# Patient Record
Sex: FEMALE | Race: BLACK OR AFRICAN AMERICAN | NOT HISPANIC OR LATINO | Employment: UNEMPLOYED | URBAN - METROPOLITAN AREA
[De-identification: names, ages, dates, MRNs, and addresses within clinical notes are randomized per-mention and may not be internally consistent; named-entity substitution may affect disease eponyms.]

---

## 2017-01-12 ENCOUNTER — CLINICAL SUPPORT (OUTPATIENT)
Dept: OBSTETRICS AND GYNECOLOGY | Facility: CLINIC | Age: 22
End: 2017-01-12
Payer: COMMERCIAL

## 2017-01-12 DIAGNOSIS — Z30.42 ENCOUNTER FOR DEPO-PROVERA CONTRACEPTION: Primary | ICD-10-CM

## 2017-01-12 PROCEDURE — 96372 THER/PROPH/DIAG INJ SC/IM: CPT | Mod: S$GLB,,, | Performed by: NURSE PRACTITIONER

## 2017-01-12 RX ADMIN — MEDROXYPROGESTERONE ACETATE 150 MG: 150 INJECTION, SUSPENSION INTRAMUSCULAR at 11:01

## 2017-01-12 NOTE — PROGRESS NOTES
ID pt with name and , verified allergies. Per MAR gave depo provera IM to left ventrogluteal.  Pt tolerated well.  Advised to remain in clinic 15 minutes.  Next dose due in 90 days, scheduled accordingly.  KAROL Blakely

## 2017-01-12 NOTE — MR AVS SNAPSHOT
[facility] - OB/ GYN  [address]  [place] [address]  Phone: [phone]  Fax: [phone]                  Maria Isabel Hughes   2017 2:00 PM   Appointment    Description:  Female : 1995   Provider:  OB GYN NURSE [facility]   Department:  [facility] - OB/ GYN                To Do List           Future Appointments        Provider Department Dept Phone    2017 2:00 PM OB GYN NURSE, [facility] OB/ -[phone]    2017 9:00 AM OB GYN NURSE, [facility] OB/ -[phone]      Goals (5 Years of Data)     None      [facility] On Call     [facility] Nurse [facility] Line -  Assistance  Registered nurses in the [facility] On Call Center provide clinical advisement, health education, appointment booking, and other advisory services.  Call for this free service at [phone].             Medications           Message regarding Medications     Verify the changes and/or additions to your medication regime listed below are the same as discussed with your clinician today.  If any of these changes or additions are incorrect, please notify your healthcare provider.             Verify that the below list of medications is an accurate representation of the medications you are currently taking.  If none reported, the list may be blank. If incorrect, please contact your healthcare provider. Carry this list with you in case of emergency.                Clinical Reference Information           Allergies as of 2017     No Known Allergies      Immunizations Administered on Date of Encounter - 2017     None

## 2017-05-08 ENCOUNTER — TELEPHONE (OUTPATIENT)
Dept: OBSTETRICS AND GYNECOLOGY | Facility: CLINIC | Age: 22
End: 2017-05-08

## 2017-05-08 DIAGNOSIS — Z30.42 ENCOUNTER FOR DEPO-PROVERA CONTRACEPTION: Primary | ICD-10-CM

## 2017-05-08 NOTE — TELEPHONE ENCOUNTER
Pt will have to abstain from intercourse for 2 weeks - come in and have a BHCG and coordinate that with getting her shot the same day - either labs early am and shot late in day or labs late in day and shot early the next day

## 2017-11-15 ENCOUNTER — LAB VISIT (OUTPATIENT)
Dept: LAB | Facility: HOSPITAL | Age: 22
End: 2017-11-15
Attending: NURSE PRACTITIONER
Payer: COMMERCIAL

## 2017-11-15 ENCOUNTER — OFFICE VISIT (OUTPATIENT)
Dept: OBSTETRICS AND GYNECOLOGY | Facility: CLINIC | Age: 22
End: 2017-11-15
Payer: COMMERCIAL

## 2017-11-15 VITALS
HEIGHT: 68 IN | DIASTOLIC BLOOD PRESSURE: 70 MMHG | WEIGHT: 155.88 LBS | SYSTOLIC BLOOD PRESSURE: 104 MMHG | BODY MASS INDEX: 23.63 KG/M2

## 2017-11-15 DIAGNOSIS — Z30.42 ENCOUNTER FOR DEPO-PROVERA CONTRACEPTION: ICD-10-CM

## 2017-11-15 DIAGNOSIS — Z32.01 POSITIVE PREGNANCY TEST: ICD-10-CM

## 2017-11-15 DIAGNOSIS — N89.8 VAGINAL DISCHARGE: ICD-10-CM

## 2017-11-15 DIAGNOSIS — Z01.419 ENCOUNTER FOR GYNECOLOGICAL EXAMINATION WITHOUT ABNORMAL FINDING: Primary | ICD-10-CM

## 2017-11-15 DIAGNOSIS — Z11.3 SCREENING FOR STD (SEXUALLY TRANSMITTED DISEASE): ICD-10-CM

## 2017-11-15 LAB
ABO + RH BLD: NORMAL
BLD GP AB SCN CELLS X3 SERPL QL: NORMAL
ERYTHROCYTE [DISTWIDTH] IN BLOOD BY AUTOMATED COUNT: 12.7 %
HCG INTACT+B SERPL-ACNC: 293 MIU/ML
HCT VFR BLD AUTO: 42.4 %
HGB BLD-MCNC: 14.3 G/DL
HGB S BLD QL SOLY: NEGATIVE
MCH RBC QN AUTO: 31.4 PG
MCHC RBC AUTO-ENTMCNC: 33.7 G/DL
MCV RBC AUTO: 93 FL
PLATELET # BLD AUTO: 340 K/UL
PMV BLD AUTO: 9.3 FL
RBC # BLD AUTO: 4.56 M/UL
WBC # BLD AUTO: 4.82 K/UL

## 2017-11-15 PROCEDURE — 88175 CYTOPATH C/V AUTO FLUID REDO: CPT

## 2017-11-15 PROCEDURE — 81025 URINE PREGNANCY TEST: CPT | Mod: S$GLB,,, | Performed by: NURSE PRACTITIONER

## 2017-11-15 PROCEDURE — 86901 BLOOD TYPING SEROLOGIC RH(D): CPT

## 2017-11-15 PROCEDURE — 84702 CHORIONIC GONADOTROPIN TEST: CPT | Mod: PO

## 2017-11-15 PROCEDURE — 86592 SYPHILIS TEST NON-TREP QUAL: CPT

## 2017-11-15 PROCEDURE — 85660 RBC SICKLE CELL TEST: CPT

## 2017-11-15 PROCEDURE — 99395 PREV VISIT EST AGE 18-39: CPT | Mod: S$GLB,,, | Performed by: NURSE PRACTITIONER

## 2017-11-15 PROCEDURE — 99999 PR PBB SHADOW E&M-EST. PATIENT-LVL II: CPT | Mod: PBBFAC,,, | Performed by: NURSE PRACTITIONER

## 2017-11-15 PROCEDURE — 86900 BLOOD TYPING SEROLOGIC ABO: CPT

## 2017-11-15 PROCEDURE — 80074 ACUTE HEPATITIS PANEL: CPT

## 2017-11-15 PROCEDURE — 86703 HIV-1/HIV-2 1 RESULT ANTBDY: CPT

## 2017-11-15 PROCEDURE — 87591 N.GONORRHOEAE DNA AMP PROB: CPT

## 2017-11-15 PROCEDURE — 36415 COLL VENOUS BLD VENIPUNCTURE: CPT | Mod: PO

## 2017-11-15 PROCEDURE — 86762 RUBELLA ANTIBODY: CPT

## 2017-11-15 PROCEDURE — 85027 COMPLETE CBC AUTOMATED: CPT | Mod: PO

## 2017-11-15 RX ORDER — MEDROXYPROGESTERONE ACETATE 150 MG/ML
150 INJECTION, SUSPENSION INTRAMUSCULAR
Status: DISCONTINUED | OUTPATIENT
Start: 2017-11-15 | End: 2017-11-15

## 2017-11-15 NOTE — PROGRESS NOTES
"CC: Well woman exam    Maria Isabel Hughes is a 22 y.o. female  presents for well woman exam.  LMP: Patient's last menstrual period was 10/17/2017 (exact date)..    Last pap absent endocervical cells will repeat pap today.  Pt has been incarcerated for last 5 mths so was off depo provera and would like to restart.    No sexual contact since before last period.      History reviewed. No pertinent past medical history.  Past Surgical History:   Procedure Laterality Date     SECTION      KNEE SURGERY      right knee surgery       Social History     Social History    Marital status: Single     Spouse name: N/A    Number of children: N/A    Years of education: N/A     Occupational History    Not on file.     Social History Main Topics    Smoking status: Current Every Day Smoker    Smokeless tobacco: Never Used    Alcohol use Yes    Drug use: No    Sexual activity: Yes     Partners: Male     Birth control/ protection: None     Other Topics Concern    Not on file     Social History Narrative    No narrative on file     Family History   Problem Relation Age of Onset    Cancer Maternal Grandmother     Diabetes Maternal Grandmother     Diabetes Maternal Grandfather     Hypertension Father     Cancer Mother      bladder    Hypertension Mother     Breast cancer Neg Hx     Colon cancer Neg Hx     Eclampsia Neg Hx     Miscarriages / Stillbirths Neg Hx     Ovarian cancer Neg Hx      labor Neg Hx     Stroke Neg Hx      OB History      Para Term  AB Living    4 2 1 1 2 2    SAB TAB Ectopic Multiple Live Births    1 0 0 0 2          /70   Ht 5' 8" (1.727 m)   Wt 70.7 kg (155 lb 13.8 oz)   LMP 10/17/2017 (Exact Date)   BMI 23.70 kg/m²       ROS:  GENERAL: Denies weight gain or weight loss. Feeling well overall.   SKIN: Denies rash or lesions.   HEAD: Denies head injury or headache.   NODES: Denies enlarged lymph nodes.   CHEST: Denies chest pain or shortness of " breath.   CARDIOVASCULAR: Denies palpitations or left sided chest pain.   ABDOMEN: No abdominal pain, constipation, diarrhea, nausea, vomiting or rectal bleeding.   URINARY: No frequency, dysuria, hematuria, or burning on urination.  REPRODUCTIVE: See HPI.   BREASTS: The patient performs breast self-examination and denies pain, lumps, or nipple discharge.   HEMATOLOGIC: No easy bruisability or excessive bleeding.   MUSCULOSKELETAL: Denies joint pain or swelling.   NEUROLOGIC: Denies syncope or weakness.   PSYCHIATRIC: Denies depression, anxiety or mood swings.    PHYSICAL EXAM:  APPEARANCE: Well nourished, well developed, in no acute distress.  AFFECT: WNL, alert and oriented x 3  SKIN: No acne or hirsutism  NECK: Neck symmetric without masses or thyromegaly  NODES: No inguinal, cervical, axillary, or femoral lymph node enlargement  CHEST: Good respiratory effect  ABDOMEN: Soft.  No tenderness or masses.  No hepatosplenomegaly.  No hernias.  BREASTS: Symmetrical, no skin changes or visible lesions.  No palpable masses, nipple discharge bilaterally.  PELVIC: Normal external genitalia without lesions.  Normal hair distribution.  Adequate perineal body, normal urethral meatus.  Vagina moist and well rugated without lesions; white creamy discharge.  Cervix pink, without lesions, discharge or tenderness.  No significant cystocele or rectocele.  Bimanual exam shows uterus to be normal size, regular, mobile and nontender.  Adnexa without masses or tenderness.    EXTREMITIES: No edema.  Physical Exam    1. Encounter for gynecological examination without abnormal finding  Liquid-based pap smear, screening   2. Vaginal discharge     3. Screening for STD (sexually transmitted disease)  C. trachomatis/N. gonorrhoeae by AMP DNA Cervix    HIV-1 and HIV-2 antibodies    RPR    Hepatitis panel, acute   4. Encounter for Depo-Provera contraception  POCT urine pregnancy    DISCONTINUED: medroxyPROGESTERone (DEPO-PROVERA) syringe 150 mg    5. Positive pregnancy test  US OB/GYN Procedure ([facility])-Future    CBC Without Differential    Rubella antibody, IgG    Hepatitis B surface antigen    Type & Screen    Sickle cell screen    AND PLAN:    Patient was counseled today on A.C.S. Pap guidelines and recommendations for yearly pelvic exams, mammograms and monthly self breast exams; to see her PCP for other health maintenance.     Discharge looks normal - see what culture and pap shows    upt in office today is positive pt appears to be about 4 wks 1 day and ESTRELLA 7/26/18  She changed her story to sexual contact was just after her last cycle not right before  Will set up for NOB and us dating for in 4 weeks  and labs today     Depo will not be given and pt to start prenatals today   /

## 2017-11-16 ENCOUNTER — PATIENT MESSAGE (OUTPATIENT)
Dept: OBSTETRICS AND GYNECOLOGY | Facility: CLINIC | Age: 22
End: 2017-11-16

## 2017-11-16 LAB
C TRACH DNA SPEC QL NAA+PROBE: NOT DETECTED
HAV IGM SERPL QL IA: NEGATIVE
HBV CORE IGM SERPL QL IA: NEGATIVE
HBV SURFACE AG SERPL QL IA: NEGATIVE
HBV SURFACE AG SERPL QL IA: NEGATIVE
HCV AB SERPL QL IA: NEGATIVE
HIV 1+2 AB+HIV1 P24 AG SERPL QL IA: NEGATIVE
N GONORRHOEA DNA SPEC QL NAA+PROBE: NOT DETECTED
RPR SER QL: NORMAL

## 2017-11-20 ENCOUNTER — PATIENT MESSAGE (OUTPATIENT)
Dept: OBSTETRICS AND GYNECOLOGY | Facility: CLINIC | Age: 22
End: 2017-11-20

## 2017-11-22 ENCOUNTER — TELEPHONE (OUTPATIENT)
Dept: OBSTETRICS AND GYNECOLOGY | Facility: CLINIC | Age: 22
End: 2017-11-22

## 2017-11-22 LAB — MAYO MISCELLANEOUS RESULT (REF): NORMAL

## 2017-11-22 NOTE — TELEPHONE ENCOUNTER
----- Message from Cruz Castro sent at 11/22/2017  8:18 AM CST -----  Pt is requesting a call from nurse to review labs.        Please call pt back at [phone]

## 2017-11-23 LAB
RUBV IGG SER-ACNC: NORMAL IU/ML
RUBV IGG SER-IMP: NORMAL

## 2017-11-24 ENCOUNTER — TELEPHONE (OUTPATIENT)
Dept: OBSTETRICS AND GYNECOLOGY | Facility: CLINIC | Age: 22
End: 2017-11-24

## 2017-11-24 NOTE — TELEPHONE ENCOUNTER
----- Message from Margaret Do sent at 11/24/2017  2:46 PM CST -----  Contact: pt   Call pt regarding test results.    ..[phone] (hldh)

## 2017-11-28 ENCOUNTER — TELEPHONE (OUTPATIENT)
Dept: OBSTETRICS AND GYNECOLOGY | Facility: CLINIC | Age: 22
End: 2017-11-28

## 2017-11-28 ENCOUNTER — HOSPITAL ENCOUNTER (EMERGENCY)
Facility: HOSPITAL | Age: 22
Discharge: HOME | End: 2017-11-28
Attending: SPECIALIST
Payer: COMMERCIAL

## 2017-11-28 VITALS
OXYGEN SATURATION: 100 % | HEART RATE: 76 BPM | SYSTOLIC BLOOD PRESSURE: 116 MMHG | HEIGHT: 69 IN | RESPIRATION RATE: 20 BRPM | DIASTOLIC BLOOD PRESSURE: 58 MMHG | BODY MASS INDEX: 22.96 KG/M2 | WEIGHT: 155 LBS | TEMPERATURE: 99 F

## 2017-11-28 DIAGNOSIS — O46.8X1 SUBCHORIONIC HEMATOMA IN FIRST TRIMESTER, SINGLE OR UNSPECIFIED FETUS: Primary | ICD-10-CM

## 2017-11-28 DIAGNOSIS — O20.0 THREATENED MISCARRIAGE: ICD-10-CM

## 2017-11-28 DIAGNOSIS — O41.8X10 SUBCHORIONIC HEMATOMA IN FIRST TRIMESTER, SINGLE OR UNSPECIFIED FETUS: Primary | ICD-10-CM

## 2017-11-28 DIAGNOSIS — O20.9 VAGINAL BLEEDING BEFORE 22 WEEKS GESTATION: ICD-10-CM

## 2017-11-28 LAB
BILIRUB UR QL STRIP: NEGATIVE
CLARITY UR: CLEAR
COLOR UR: YELLOW
GLUCOSE UR QL STRIP: NEGATIVE
HCG INTACT+B SERPL-ACNC: NORMAL MIU/ML
HGB UR QL STRIP: ABNORMAL
KETONES UR QL STRIP: NEGATIVE
LEUKOCYTE ESTERASE UR QL STRIP: NEGATIVE
MICROSCOPIC COMMENT: NORMAL
NITRITE UR QL STRIP: NEGATIVE
PH UR STRIP: 7 [PH] (ref 5–8)
PROT UR QL STRIP: NEGATIVE
RBC #/AREA URNS HPF: 1 /HPF (ref 0–4)
SP GR UR STRIP: 1.01 (ref 1–1.03)
SQUAMOUS #/AREA URNS HPF: 5 /HPF
URN SPEC COLLECT METH UR: ABNORMAL
UROBILINOGEN UR STRIP-ACNC: NEGATIVE EU/DL

## 2017-11-28 PROCEDURE — 84702 CHORIONIC GONADOTROPIN TEST: CPT

## 2017-11-28 PROCEDURE — 81000 URINALYSIS NONAUTO W/SCOPE: CPT

## 2017-11-28 PROCEDURE — 99285 EMERGENCY DEPT VISIT HI MDM: CPT

## 2017-11-28 NOTE — TELEPHONE ENCOUNTER
Patient calling states that she woke up this morning to heavy bleeding no pain, per ximena allison to have patient go to ER to be seen.

## 2017-11-28 NOTE — ED PROVIDER NOTES
"   History      Chief Complaint   Patient presents with    Vaginal Bleeding     Pt states, "I am 6 weeks pregnant and I woke up with heavy bleeding."       Review of patient's allergies indicates:  No Known Allergies     HPI   HPI    2017, 11:14 AM   History obtained from the patient      History of Present Illness: Maria Isabel Hughes is a 22 y.o. female patient who presents to the Emergency Department for vag bleeding since this am. She says she is 6 weeks gestation, lmp 1017.  She says she has used 1 pad today.   Rh - per epic.  She denies pain, fever, n/v.  Symptoms are moderate in severity.     No further complaints or concerns at this time.           PCP: Janes Feliz MD       Past Medical History:  History reviewed. No pertinent past medical history.      Past Surgical History:  Past Surgical History:   Procedure Laterality Date     SECTION      KNEE SURGERY      right knee surgery             Family History:  Family History   Problem Relation Age of Onset    Cancer Maternal Grandmother     Diabetes Maternal Grandmother     Diabetes Maternal Grandfather     Hypertension Father     Cancer Mother      bladder    Hypertension Mother     Breast cancer Neg Hx     Colon cancer Neg Hx     Eclampsia Neg Hx     Miscarriages / Stillbirths Neg Hx     Ovarian cancer Neg Hx      labor Neg Hx     Stroke Neg Hx            Social History:  Social History     Social History Main Topics    Smoking status: Current Every Day Smoker    Smokeless tobacco: Never Used    Alcohol use Yes    Drug use: No    Sexual activity: Yes     Partners: Male     Birth control/ protection: None       ROS     Review of Systems   Constitutional: Negative for chills and fever.   HENT: Negative for sore throat.    Respiratory: Negative for shortness of breath.    Cardiovascular: Negative for chest pain.   Gastrointestinal: Negative for nausea and vomiting.   Genitourinary: Positive for vaginal bleeding. " "Negative for dysuria.   Musculoskeletal: Negative for back pain.   Skin: Negative for rash.   Neurological: Negative for weakness.   Hematological: Does not bruise/bleed easily.   All other systems reviewed and are negative.      Physical Exam      Initial Vitals [11/28/17 1055]   BP Pulse Resp Temp SpO2   (!) 116/58 76 20 99 °F (37.2 °C) 100 %      MAP       77.33         Physical Exam  Vital signs and nursing notes reviewed.  Constitutional: Patient is in NAD. Awake and alert. Well-developed and well-nourished.  Head: Atraumatic. Normocephalic.  Eyes: PERRL. EOM intact. Conjunctivae nl. No scleral icterus.  ENT: Mucous membranes are moist. Oropharynx is clear.  Neck: Supple. No JVD. No lymphadenopathy.  No meningismus  Cardiovascular: Regular rate and rhythm. No murmurs, rubs, or gallops. Distal pulses are 2+ and symmetric.  Pulmonary/Chest: No respiratory distress. Clear to auscultation bilaterally. No wheezing, rales, or rhonchi.  Abdominal: Soft. Non-distended. No TTP. No rebound, guarding, or rigidity. Good bowel sounds.  Genitourinary: No CVA tenderness  Musculoskeletal: Moves all extremities. No edema.   Pelvic: A female chaperone was present for this examination. Nl external inspection. No lesions or abnormalities were visible on the labia majora or minora. Cervical os is closed. There is no CMT. There is trace amounts of blood in the vaginal vault. No clots or products of cenception. No discharge. No adnexal tenderness. No adnexal masses.  Skin: Warm and dry.  Neurological: Awake and alert. No acute focal neurological deficits are appreciated.  Psychiatric: Normal affect. Good eye contact. Appropriate in content.      ED Course          Procedures  ED Vital Signs:  Vitals:    11/28/17 1055   BP: (!) 116/58   Pulse: 76   Resp: 20   Temp: 99 °F (37.2 °C)   TempSrc: Oral   SpO2: 100%   Weight: 70.3 kg (155 lb)   Height: 5' 8.5" (1.74 m)         Results for orders placed or performed during the hospital " encounter of 11/28/17   hCG, quantitative, pregnancy   Result Value Ref Range    hCG Quant 22664 See Text mIU/mL   Urinalysis   Result Value Ref Range    Specimen UA Urine, Clean Catch     Color, UA Yellow Yellow, Straw, Beena    Appearance, UA Clear Clear    pH, UA 7.0 5.0 - 8.0    Specific Gravity, UA 1.010 1.005 - 1.030    Protein, UA Negative Negative    Glucose, UA Negative Negative    Ketones, UA Negative Negative    Bilirubin (UA) Negative Negative    Occult Blood UA 1+ (A) Negative    Nitrite, UA Negative Negative    Urobilinogen, UA Negative <2.0 EU/dL    Leukocytes, UA Negative Negative   Urinalysis Microscopic   Result Value Ref Range    RBC, UA 1 0 - 4 /hpf    Squam Epithel, UA 5 /hpf    Microscopic Comment SEE COMMENT              Imaging Results:  Imaging Results          US OB Less Than 14 Wks First Gestation (Final result)  Result time 11/28/17 15:06:45    Final result by Jone Obando MD (11/28/17 15:06:45)                 Impression:        Single viable IUP with gestational age of 6 weeks 2 days and ESTRELLA of 7/21/18 .    Moderate subchorionic hemorrhage is seen measuring 4.3 x 1.0 x 3.5 cm posterior to the gestational sac. Recommend followup ultrasound in one week and GYN consult.     Findings discussed with KYLE RUTHERFORD at 15:06:31       Electronically signed by: JONE OBANDO MD  Date:     11/28/17  Time:    15:06              Narrative:    History: Hemorrhage    Comparison: None.    Findings: Last menstrual period 10/17/17. BetaHCG 23,684    The uterus demonstrates a gestational sac with a mean sac diameter of 1.9 cm.  Within the gestational sac is a fetus measuring 0.54 cm (CRL).  This is consistent with a 6 week and 3 day fetus. Fetal heart rate is 96 bpm.Yolk sac is unremarkable.     Moderate subchorionic hemorrhage is seen measuring 4.3 x 1.0 x 3.5 cm posterior to the gestational sac.    The uterus measures 10 x 5.1 x 6 cm     Right ovary measures 2.6 x 1.4 x 2.2 cm and is unremarkable.  Left  ovary measures 2.8 x 1.8 x 1.7 cm and is unremarkable.                                 The Emergency Provider reviewed the vital signs and test results, which are outlined above.    ED Discussion      3:48 PM: RAFAT Macario, discussed the pt's case with Dr. Galarza (OB/GYN) who recommends pt f/u with OB this week.      3:52 PM:  Discussed with pt all pertinent ED information and results over phone.   Discussed pt dx and plan of tx. Gave pt all f/u and return to the ED instructions. All questions and concerns were addressed at this time. Pt expresses understanding of information and instructions, and is comfortable with plan to discharge. Pt is stable for discharge.    I discussed with patient and/or family/caretaker that her symptoms place her at risk for a threatened spontaneous .  Any worse vaginal bleeding or abdominal pain should be evaluated immediately by her OB GYN or in the ED.  If evaluation does not show an IUP, I have counseled patient that current tests do not demonstrate an IUP but that she is stable for discharge at this time with the understanding that she needs follow up within 48 - 72 hours for repeat testing.      Medication(s) given in the ER:  Medications - No data to display        Follow-up Information     [facility] - OB/ GYN In 2 days.    Specialty:  Obstetrics and Gynecology  Contact information:  [address] [facility] [facility] [address] [phone]  Additional information:  (off [facility]) 4th floor, Please check in for your appointment in the Women's Services Department located through the doorway to the left of the elevators.           [facility]    Specialty:  Emergency Medicine  Why:  If symptoms worsen  Contact information:  [address] [facility] [address] [phone]                     There are no discharge medications for this patient.         Medical Decision Making        All findings were reviewed with the  patient/family in detail.   All remaining questions and concerns were addressed at that time.  Patient/family has been counseled regarding the need for follow-up as well as the indication to return to the emergency room should new or worrisome developments occur.    Medical Decision Making:   Clinical Tests:   Lab Tests: Ordered and Reviewed  Radiological Study: Ordered and Reviewed     MDM               Clinical Impression:        ICD-10-CM ICD-9-CM   1. Subchorionic hematoma in first trimester, single or unspecified fetus O41.8X10 656.83    O46.8X1    2. Vaginal bleeding before 22 weeks gestation O20.9 640.90   3. Threatened miscarriage O20.0 640.00       Disposition:   Disposition: Discharged  Condition: Stable       Angeal Alfonso PA-C  11/29/17 [id number]

## 2017-11-30 ENCOUNTER — OFFICE VISIT (OUTPATIENT)
Dept: OBSTETRICS AND GYNECOLOGY | Facility: CLINIC | Age: 22
End: 2017-11-30
Payer: COMMERCIAL

## 2017-11-30 VITALS
WEIGHT: 153 LBS | BODY MASS INDEX: 22.66 KG/M2 | SYSTOLIC BLOOD PRESSURE: 118 MMHG | HEIGHT: 69 IN | DIASTOLIC BLOOD PRESSURE: 60 MMHG

## 2017-11-30 DIAGNOSIS — Z3A.01 LESS THAN 8 WEEKS GESTATION OF PREGNANCY: Primary | ICD-10-CM

## 2017-11-30 PROCEDURE — 0500F INITIAL PRENATAL CARE VISIT: CPT | Mod: S$GLB,,, | Performed by: ADVANCED PRACTICE MIDWIFE

## 2017-11-30 PROCEDURE — 99999 PR PBB SHADOW E&M-EST. PATIENT-LVL III: CPT | Mod: PBBFAC,,, | Performed by: ADVANCED PRACTICE MIDWIFE

## 2017-11-30 NOTE — PROGRESS NOTES
"Subjective:      Maria Isabel Hughes is a 22 y.o. female. Maria Isabel reports bleeding one time on . She is not in acute distress. Ectopic risks: none.    Pregnancy testing: quant HCG level 80640 on .  Pregnancy imaging: done on  6w2d FHR 92.  Blood type: O positive.  Other lab results: none.    The following portions of the patient's history were reviewed and updated as appropriate: allergies, current medications, past family history, past medical history, past social history, past surgical history and problem list.    Review of Systems  A comprehensive review of systems was negative.     Objective:       /60 (BP Location: Left arm, Patient Position: Sitting, BP Method: Medium (Manual))   Ht 5' 8.5" (1.74 m)   Wt 69.4 kg (153 lb)   LMP 10/17/2017 (Exact Date)   Breastfeeding? No   BMI 22.93 kg/m²   General:   alert, appears stated age and cooperative       Assessment:          IUP at 6w4d weeks gestation  Threatened      Plan:      Blood type and Rh: positive.  Follow-up appointment with new OB and US on 17.  Warning signs discussed: to call for increased bleeding, abdominal or shoulder pain, light headedness, or if she has any concerns.   "

## 2017-12-13 ENCOUNTER — INITIAL PRENATAL (OUTPATIENT)
Dept: OBSTETRICS AND GYNECOLOGY | Facility: CLINIC | Age: 22
End: 2017-12-13
Payer: COMMERCIAL

## 2017-12-13 ENCOUNTER — PROCEDURE VISIT (OUTPATIENT)
Dept: OBSTETRICS AND GYNECOLOGY | Facility: CLINIC | Age: 22
End: 2017-12-13
Payer: COMMERCIAL

## 2017-12-13 VITALS — WEIGHT: 149.5 LBS | BODY MASS INDEX: 22.4 KG/M2 | SYSTOLIC BLOOD PRESSURE: 128 MMHG | DIASTOLIC BLOOD PRESSURE: 66 MMHG

## 2017-12-13 DIAGNOSIS — R11.0 NAUSEA: Primary | ICD-10-CM

## 2017-12-13 DIAGNOSIS — Z32.01 POSITIVE PREGNANCY TEST: ICD-10-CM

## 2017-12-13 DIAGNOSIS — Z34.81 ENCOUNTER FOR SUPERVISION OF OTHER NORMAL PREGNANCY IN FIRST TRIMESTER: ICD-10-CM

## 2017-12-13 PROBLEM — Z34.91 ENCOUNTER FOR SUPERVISION OF NORMAL PREGNANCY IN FIRST TRIMESTER: Status: ACTIVE | Noted: 2017-12-13

## 2017-12-13 PROCEDURE — 76801 OB US < 14 WKS SINGLE FETUS: CPT | Mod: S$GLB,,, | Performed by: OBSTETRICS & GYNECOLOGY

## 2017-12-13 PROCEDURE — 99213 OFFICE O/P EST LOW 20 MIN: CPT | Mod: S$GLB,,, | Performed by: ADVANCED PRACTICE MIDWIFE

## 2017-12-13 PROCEDURE — 99999 PR PBB SHADOW E&M-EST. PATIENT-LVL II: CPT | Mod: PBBFAC,,, | Performed by: ADVANCED PRACTICE MIDWIFE

## 2017-12-13 RX ORDER — ONDANSETRON 4 MG/1
4 TABLET, ORALLY DISINTEGRATING ORAL EVERY 8 HOURS PRN
Qty: 30 TABLET | Refills: 2 | Status: SHIPPED | OUTPATIENT
Start: 2017-12-13 | End: 2018-05-08

## 2017-12-13 NOTE — PATIENT INSTRUCTIONS
Be Smoke-Free During Pregnancy  Youve quit smoking because youre pregnant. At first, not smoking may be new and exciting. Its the best sort of change. People will congratulate you. You have a right to be proud, so enjoy it. But then what? How do you stay smoke-free when life goes back to normal? Plan ahead to fight temptation. Be aware of signs that warn of a slip.  Make your success stronger  Get busy and build on your early success. List the benefits of staying smoke-free. Your list, like your life, will change. Youre finding out who you are as a nonsmoker. Remember how smoking can affect your baby. Then ask yourself, Which is more important -- smoking, or my baby?  Have a smoke-free home  After you quit, you still need to think about avoiding secondhand smoke. Help yourself succeed by making your home smoke-free:  · Ask your spouse, partner, or roommate to smoke outside.  · Tell your friends and family youve quit. Let them know your home is now smoke-free and youd like them to honor your decision.  · Also tell them that you appreciate their help. Because they keep smoke out of your home, your baby stays healthy and you stay smoke-free.  Prepare to be tempted  You will be tempted. Tough times are still ahead. Get ready to resist your urge to smoke. You know the triggers: car trips, holidays, and seeing friends who still smoke. The tips below can help you resist:  · Talk to your baby.  · Make your home a smoke-free zone.  · Make a list of all you can smell, taste, and do better since you quit smoking.  · Pack a survival kit to take in the car.  · When you eat out, go to smoke-free restaurants.  Heed warning signs  Ever daydream about smoking, or go to risky places, like lunch with a group of smokers? These could be warning signs that youre headed for a slip. If you feel tempted to smoke, ask yourself when and why you feel this way.  HALT your desire!  Keep yourself from feeling too Hungry, Angry,  Lonely, or Tired:  · Hungry? Fix yourself a healthy snack.  · Angry? Try some slow, deep breathing.  · Lonely? Visit a nonsmoking friend.  · Tired? Kick off your shoes, take a nap.  If you slip  If you do slip, it doesnt mean youre not quitting. Whether you sneak a smoke or boldly inhale, tell yourself youre no longer a smoker. A slip is not a relapse. Dont let all your hard work so far be lost. Find out why you lit up. Then make a new plan to help yourself be stronger.  Find out why you slipped  A slip can be useful. If youre honest, the slip might tell you something. Do your best to answer these questions:  · How did the cigarette taste?  · How did your hands smell after you smoked?  · What did you learn about being tempted?  · Have you found a new trigger?  · What can you do to avoid slips in the future?     For more information  Here are some resources to help you stay smoke-free:  · smokefree.gov/pxvj-cp-iy-expert  · women.smokefree.gov  · National [employer] [place] Smoking Quitline: 877-44U-QUIT (750-955-9538)   Date Last Reviewed: 8/16/2015  © 5080-7724 [employer]. [address] [place] [address]. All rights reserved. This information is not intended as a substitute for professional medical care. Always follow your healthcare professional's instructions.        Adapting to Pregnancy: First Trimester  As your body adjusts, you may have to change or limit your daily activities. Youll need more rest. You may also need to use the energy you have more wisely.     Eat stomach-friendly foods like cottage cheese, crackers, or bread throughout the day.   Your changing body  Almost every part of your body is affected as you adapt to pregnancy. The uterus and cervix will begin to soften right away. You may not look very pregnant during the first 3 months. But you are likely to have some common signs of early pregnancy:  · Nausea  · Fatigue  · Frequent urination  · Mood swings  · Bloating of  the abdomen  · Missed or light periods (first trimester bleeding)  · Nipple or breast tenderness, breast swelling  Its not too late to start good habits  What matters most is protecting your baby from this moment on. If you smoke, drink alcohol, or use drugs, now is the time to stop. If you need help, talk with your healthcare provider.  · Smoking increases the risk of  stillbirth or having a low-birth-weight baby. If you smoke, quit now.  · Alcohol and drugs have been linked with miscarriage, birth defects, intellectual disability, and low birth weight. Do not drink alcohol or take drugs.  Tips to relieve nausea  Although nausea can happen at any time of the day, it may be worse in the morning. To help prevent nausea:  · Eat small, light meals at frequent intervals.  · Get up slowly. Eat a few unsalted crackers before you get out of bed.  · Avoid smells that bother you.  · Avoid spicy and fatty foods.  · Eat an ice pop in your favorite flavor.  · Get plenty of rest.  · Ask your healthcare provider about taking erendira or vitamin B6 for nausea and vomiting.  · Talk with your healthcare provider if you take vitamins that upset your stomach.  Work concerns  The end of the first trimester is a good time to discuss working during pregnancy with your employer. Follow your healthcare providers advice if your job requires you to stand for a long time, work with hazardous tools, or even sit at a desk all day. Your workspace, workload, or scheduled hours may need to be adjusted. Perhaps you can change body postures more often or take an extra break.  Advice for travel  Talk to your healthcare provider first, but the second trimester may be the best time for any travel. You may be advised to avoid certain trips while youre pregnant. Food and water can be concerns in developing countries. Travel by car is a good choice, as you can stop, get out, and stretch. Bring snacks and water along. Fasten the lap belt below your belly,  "low over your hips. Also be sure to wear the shoulder harness.  Intimacy  Unless your healthcare provider tells you to, there is no reason to stop having sex while youre pregnant. You or your partner may notice changes in desire. Desire may be less in the first trimester, due to nausea and fatigue. In the second trimester, sex may be very enjoyable. The third trimester can be a challenge comfort-wise. Try different positions and see whats best for you both.  Date Last Reviewed: 8/16/2015  © 7133-6096 [employer]. [address] [place] [id number]. All rights reserved. This information is not intended as a substitute for professional medical care. Always follow your healthcare professional's instructions.        Pregnancy: Your First Trimester Changes  The first trimester is a time of rapid development for your baby. Because your baby is growing so quickly, it is important that you start a healthy lifestyle right away. By the end of the first trimester, your baby has formed all of its major body organs and weighs just over an ounce.     Actual size of baby is 1/4"    Month 1 (Weeks 1 to 4)  The placenta (the organ that nourishes your baby) begins to form. The brain, spinal cord, heart, gastrointestinal tract, and lungs begin to develop. Your baby is about 1/4 inch long by the end of the first month.     Actual size of baby is 1"    Month 2 (Weeks 5 to 8)  All of your babys major body organs form. The face, fingers, toes, ears, and eyes appear. By the end of the month, your baby is about 1-inch long.     Actual size of baby is 4"    Month 3 (Weeks 9 to 12)  Your baby can open and close its fists and mouth. The sexual organs begin to form. As the first trimester ends, your baby is about 3-inches long.  Date Last Reviewed: 8/16/2015  © 5414-8161 [employer]. [address] [place] [address]. All rights reserved. This information is not intended as a substitute for " professional medical care. Always follow your healthcare professional's instructions.

## 2017-12-13 NOTE — PROGRESS NOTES
Doing okay. C/o aches all over body, daily n/v. Denies fever, cough, chills   Reviewed PN labs   Encourage to continue with decreasing tobacco use  Declines screening    US today- confirmation of IUP. Gestionational sac, yolk sac, embryo visualized. Cardiac activity present, FHR 171bpm. Uterus- subchorionic hemorrhage 43 x 23 x 13 mm. IUP consistent with LMP dating     Coffective counseling sheet Get Ready discussed with mother. Reinforced avoiding induction of labor unless medically indicated as well as comfort measures during labor.  Encouraged mother to download Coffective mobile pablo if she has not already done so. Mother verbalizes understanding.

## 2018-01-03 ENCOUNTER — TELEPHONE (OUTPATIENT)
Dept: OBSTETRICS AND GYNECOLOGY | Facility: CLINIC | Age: 23
End: 2018-01-03

## 2018-01-03 NOTE — TELEPHONE ENCOUNTER
----- Message from Samantha Do sent at 1/3/2018 12:53 PM CST -----  Contact: Patient   Patient wants to know if it's safe for her to eat sushi, Please call her at [phone].    Thanks  td

## 2018-01-03 NOTE — TELEPHONE ENCOUNTER
Informed pt. She can eat sushi as long as it is cooked. Pt. Advised to avoid big fish and to limit tuna intake as per Dr. Ly. Pt. Voiced understanding.

## 2018-01-15 ENCOUNTER — ROUTINE PRENATAL (OUTPATIENT)
Dept: OBSTETRICS AND GYNECOLOGY | Facility: CLINIC | Age: 23
End: 2018-01-15
Payer: COMMERCIAL

## 2018-01-15 VITALS
SYSTOLIC BLOOD PRESSURE: 122 MMHG | WEIGHT: 154.75 LBS | DIASTOLIC BLOOD PRESSURE: 66 MMHG | BODY MASS INDEX: 23.19 KG/M2

## 2018-01-15 DIAGNOSIS — Z34.91 ENCOUNTER FOR SUPERVISION OF NORMAL PREGNANCY IN FIRST TRIMESTER, UNSPECIFIED GRAVIDITY: Primary | ICD-10-CM

## 2018-01-15 DIAGNOSIS — O34.219 HISTORY OF CESAREAN DELIVERY, CURRENTLY PREGNANT: ICD-10-CM

## 2018-01-15 DIAGNOSIS — Z72.0 TOBACCO ABUSE: ICD-10-CM

## 2018-01-15 PROCEDURE — 0502F SUBSEQUENT PRENATAL CARE: CPT | Mod: S$GLB,,, | Performed by: ADVANCED PRACTICE MIDWIFE

## 2018-01-15 PROCEDURE — 99999 PR PBB SHADOW E&M-EST. PATIENT-LVL I: CPT | Mod: PBBFAC,,, | Performed by: ADVANCED PRACTICE MIDWIFE

## 2018-01-15 NOTE — PROGRESS NOTES
Desires - Consult with Dr Ly next available   Doing well, no complaints  Decreased smoking to 3-4 cigarettes per day  Declines flu/screening   Discussed use of nitrous oxide/epidural for labor. Pt desires to       CON MELLO

## 2018-02-27 ENCOUNTER — TELEPHONE (OUTPATIENT)
Dept: OBSTETRICS AND GYNECOLOGY | Facility: CLINIC | Age: 23
End: 2018-02-27

## 2018-02-27 DIAGNOSIS — Z34.92 NORMAL PREGNANCY IN SECOND TRIMESTER: Primary | ICD-10-CM

## 2018-02-27 NOTE — TELEPHONE ENCOUNTER
----- Message from Margaret Do sent at 2/27/2018 11:10 AM CST -----  Contact: pt   Call pt regarding a question       [phone] (home)

## 2018-03-06 ENCOUNTER — PROCEDURE VISIT (OUTPATIENT)
Dept: OBSTETRICS AND GYNECOLOGY | Facility: CLINIC | Age: 23
End: 2018-03-06
Payer: COMMERCIAL

## 2018-03-06 ENCOUNTER — ROUTINE PRENATAL (OUTPATIENT)
Dept: OBSTETRICS AND GYNECOLOGY | Facility: CLINIC | Age: 23
End: 2018-03-06
Payer: COMMERCIAL

## 2018-03-06 VITALS
WEIGHT: 149.94 LBS | SYSTOLIC BLOOD PRESSURE: 124 MMHG | BODY MASS INDEX: 22.46 KG/M2 | DIASTOLIC BLOOD PRESSURE: 66 MMHG

## 2018-03-06 DIAGNOSIS — Z36.89 ENCOUNTER FOR FETAL ANATOMIC SURVEY: ICD-10-CM

## 2018-03-06 DIAGNOSIS — Z34.92 NORMAL PREGNANCY IN SECOND TRIMESTER: ICD-10-CM

## 2018-03-06 DIAGNOSIS — F31.9 BIPOLAR 1 DISORDER: ICD-10-CM

## 2018-03-06 DIAGNOSIS — O34.219 HISTORY OF CESAREAN DELIVERY, CURRENTLY PREGNANT: Primary | ICD-10-CM

## 2018-03-06 PROCEDURE — 76805 OB US >/= 14 WKS SNGL FETUS: CPT | Mod: S$GLB,,, | Performed by: OBSTETRICS & GYNECOLOGY

## 2018-03-06 PROCEDURE — 0502F SUBSEQUENT PRENATAL CARE: CPT | Mod: S$GLB,,, | Performed by: ADVANCED PRACTICE MIDWIFE

## 2018-03-06 PROCEDURE — 99999 PR PBB SHADOW E&M-EST. PATIENT-LVL I: CPT | Mod: PBBFAC,,, | Performed by: ADVANCED PRACTICE MIDWIFE

## 2018-03-06 NOTE — PROGRESS NOTES
Anatomy US-Anterior placenta, normal fluid, Normal female with some sub-op views- will repeat next vist  Feeling some depression/anger has seen Psychiatrist and will contact re meds and management  Coffective counseling sheet Fall In Love discussed with mother. Reinforced immediate skin to skin, the magic first hour, importance of the first feeding and delaying routine procedures. Encouraged mother to download Coffective mobile pablo if she has not already done so. Mother verbalizes understanding.    Coffective counseling sheet Learn Your Baby discussed with mother. Instructed regarding feeding cues and methods to calm baby. Encouraged mother to download Coffective mobile pablo if she has not already done so.  Mother verbalized understanding.

## 2018-04-12 ENCOUNTER — LAB VISIT (OUTPATIENT)
Dept: LAB | Facility: HOSPITAL | Age: 23
End: 2018-04-12
Attending: ADVANCED PRACTICE MIDWIFE
Payer: COMMERCIAL

## 2018-04-12 ENCOUNTER — ROUTINE PRENATAL (OUTPATIENT)
Dept: OBSTETRICS AND GYNECOLOGY | Facility: CLINIC | Age: 23
End: 2018-04-12
Payer: COMMERCIAL

## 2018-04-12 ENCOUNTER — PROCEDURE VISIT (OUTPATIENT)
Dept: OBSTETRICS AND GYNECOLOGY | Facility: CLINIC | Age: 23
End: 2018-04-12
Payer: COMMERCIAL

## 2018-04-12 VITALS — WEIGHT: 154.31 LBS | BODY MASS INDEX: 23.12 KG/M2 | SYSTOLIC BLOOD PRESSURE: 94 MMHG | DIASTOLIC BLOOD PRESSURE: 64 MMHG

## 2018-04-12 DIAGNOSIS — O34.219 HISTORY OF CESAREAN DELIVERY, CURRENTLY PREGNANT: Primary | ICD-10-CM

## 2018-04-12 DIAGNOSIS — O34.219 HISTORY OF CESAREAN DELIVERY, CURRENTLY PREGNANT: ICD-10-CM

## 2018-04-12 DIAGNOSIS — Z36.3 ANTENATAL SCREENING FOR MALFORMATION USING ULTRASONICS: ICD-10-CM

## 2018-04-12 LAB
BASOPHILS # BLD AUTO: 0.04 K/UL
BASOPHILS NFR BLD: 0.6 %
DIFFERENTIAL METHOD: ABNORMAL
EOSINOPHIL # BLD AUTO: 0.2 K/UL
EOSINOPHIL NFR BLD: 2.4 %
ERYTHROCYTE [DISTWIDTH] IN BLOOD BY AUTOMATED COUNT: 12.7 %
GLUCOSE SERPL-MCNC: 80 MG/DL
HCT VFR BLD AUTO: 36.5 %
HGB BLD-MCNC: 11.7 G/DL
IMM GRANULOCYTES # BLD AUTO: 0.04 K/UL
IMM GRANULOCYTES NFR BLD AUTO: 0.6 %
LYMPHOCYTES # BLD AUTO: 1.4 K/UL
LYMPHOCYTES NFR BLD: 21.9 %
MCH RBC QN AUTO: 31.7 PG
MCHC RBC AUTO-ENTMCNC: 32.1 G/DL
MCV RBC AUTO: 99 FL
MONOCYTES # BLD AUTO: 0.5 K/UL
MONOCYTES NFR BLD: 8.7 %
NEUTROPHILS # BLD AUTO: 4.1 K/UL
NEUTROPHILS NFR BLD: 65.8 %
NRBC BLD-RTO: 0 /100 WBC
PLATELET # BLD AUTO: 242 K/UL
PMV BLD AUTO: 12.4 FL
RBC # BLD AUTO: 3.69 M/UL
WBC # BLD AUTO: 6.21 K/UL

## 2018-04-12 PROCEDURE — 82950 GLUCOSE TEST: CPT

## 2018-04-12 PROCEDURE — 99999 PR PBB SHADOW E&M-EST. PATIENT-LVL II: CPT | Mod: PBBFAC,,, | Performed by: ADVANCED PRACTICE MIDWIFE

## 2018-04-12 PROCEDURE — 85025 COMPLETE CBC W/AUTO DIFF WBC: CPT

## 2018-04-12 PROCEDURE — 86703 HIV-1/HIV-2 1 RESULT ANTBDY: CPT

## 2018-04-12 PROCEDURE — 86592 SYPHILIS TEST NON-TREP QUAL: CPT

## 2018-04-12 PROCEDURE — 36415 COLL VENOUS BLD VENIPUNCTURE: CPT | Mod: PO

## 2018-04-12 PROCEDURE — 0502F SUBSEQUENT PRENATAL CARE: CPT | Mod: S$GLB,,, | Performed by: ADVANCED PRACTICE MIDWIFE

## 2018-04-12 PROCEDURE — 76805 OB US >/= 14 WKS SNGL FETUS: CPT | Mod: S$GLB,,, | Performed by: OBSTETRICS & GYNECOLOGY

## 2018-04-12 NOTE — PROGRESS NOTES
28 week lab in progress  US anterior placenta, Breech, 3vc, Normal fluid,  EFW 1.12ozs  Sub op heart- rep[eat next visit      Coffective counseling sheet Nourish discussed with mother. Reinforced basic breastfeeding position and latch as well as proper hand expression technique. Encouraged mother to download Coffective mobile pablo if she has not already done so.  Mother verbalizes understanding.

## 2018-04-13 LAB
HIV 1+2 AB+HIV1 P24 AG SERPL QL IA: NEGATIVE
RPR SER QL: NORMAL

## 2018-05-08 ENCOUNTER — ROUTINE PRENATAL (OUTPATIENT)
Dept: OBSTETRICS AND GYNECOLOGY | Facility: CLINIC | Age: 23
End: 2018-05-08
Payer: COMMERCIAL

## 2018-05-08 ENCOUNTER — PROCEDURE VISIT (OUTPATIENT)
Dept: OBSTETRICS AND GYNECOLOGY | Facility: CLINIC | Age: 23
End: 2018-05-08
Payer: COMMERCIAL

## 2018-05-08 VITALS
BODY MASS INDEX: 22.73 KG/M2 | SYSTOLIC BLOOD PRESSURE: 104 MMHG | DIASTOLIC BLOOD PRESSURE: 62 MMHG | WEIGHT: 151.69 LBS

## 2018-05-08 DIAGNOSIS — O34.219 HISTORY OF CESAREAN DELIVERY, CURRENTLY PREGNANT: ICD-10-CM

## 2018-05-08 DIAGNOSIS — Z36.9 ENCOUNTER FOR FETAL ULTRASOUND: Primary | ICD-10-CM

## 2018-05-08 PROCEDURE — 76816 OB US FOLLOW-UP PER FETUS: CPT | Mod: S$GLB,,, | Performed by: OBSTETRICS & GYNECOLOGY

## 2018-05-08 PROCEDURE — 99999 PR PBB SHADOW E&M-EST. PATIENT-LVL II: CPT | Mod: PBBFAC,,, | Performed by: ADVANCED PRACTICE MIDWIFE

## 2018-05-08 PROCEDURE — 0502F SUBSEQUENT PRENATAL CARE: CPT | Mod: S$GLB,,, | Performed by: ADVANCED PRACTICE MIDWIFE

## 2018-05-08 PROCEDURE — 90471 IMMUNIZATION ADMIN: CPT | Mod: S$GLB,,, | Performed by: OBSTETRICS & GYNECOLOGY

## 2018-05-08 PROCEDURE — 90715 TDAP VACCINE 7 YRS/> IM: CPT | Mod: S$GLB,,, | Performed by: OBSTETRICS & GYNECOLOGY

## 2018-05-08 NOTE — NURSING
Patient identified using two patient identifiers. Allergies and prescriptions reviewed. Pt received a Tdap IM injection to the left deltoid. Pt tolerated injection with no complaints. Pt was instructed to wait 15 minutes to monitor for S/S of side effects. Pt verbalized understanding. No S/S noted at this time.

## 2018-05-08 NOTE — PATIENT INSTRUCTIONS
Kick Counts    Its normal to worry about your babys health. One way you can know your babys doing well is to record the babys movements once a day. This is called a kick count. Remember to take your kick count records to all your appointments with your healthcare provider.  How to count kicks  Here are tips for counting kicks:  · Choose a time when the baby is active, such as after a meal.   · Sit comfortably or lie on your side.   · The first time the baby moves, write down the time.   · Count each movement until the baby has moved 10 times. This can take from 20 minutes to 2 hours.   · Try to do it at the same time each day.  When to call your healthcare provider  Call your healthcare provider right away if you notice any of the following:  · Your baby moves fewer than 10 times in 2 hours while youre doing kick counts.  · Your baby moves much less often than on the days before.  · You have not felt your baby move all day.  Date Last Reviewed: 8/5/2015 © 2000-2017 [employer]. [address] [place] [address]. All rights reserved. This information is not intended as a substitute for professional medical care. Always follow your healthcare professional's instructions.        Birth Control Methods  Birth control methods are used to help prevent pregnancy. There are many different methods to choose from. Talk to your healthcare provider about which method is right for you. Be sure to ask your provider about the effectiveness of each method. Also ask about the benefits, risks, and side effects of each method.  Hormones  Some birth control methods work by releasing hormones such as progestin and estrogen. These methods include: hormone implants, hormone shots, the vaginal ring, the patch, and birth control pills. They all work by stopping ovulation (release of the egg from the ovary). The implant is a small device that needs to be placed in the upper arm by a trained healthcare provider.  It works for up to 3 years. Hormone injections must be repeated every 3 months. The vaginal ring must be replaced monthly (it can be removed during the fourth week of each cycle). The patch must be replaced weekly (it is not worn during the fourth week of each cycle). Birth control pills must be taken every day. Note that all of these methods are effective and can be stopped at any time.  Intrauterine Device (IUD)  An IUD is a small, T-shaped device. It must be placed in the uterus by a trained healthcare provider. There are different types of IUDs available. They work by causing changes in the uterus that make it harder for sperm to reach the egg. Depending on the type of IUD you have, it may work for several years or longer. The IUD is a reversible birth control method. This means it can be removed at any time.  Condom  A condom is a sheath that forms a thin barrier between the penis and the vagina. It helps prevent pregnancy by keeping sperm from entering the vagina. When latex condoms are used, they have the added benefit of protecting against most STDs (sexually transmitted diseases). Condoms should be discarded after each use. Ask your healthcare provider about the different types of condoms available. These include both the male condom and female condom.  Spermicide  Spermicides come as foams, jellies, creams, suppositories, and tablets.  They help prevent pregnancy by killing sperm. When used alone they are not that reliable. They work best when combined with other birth control methods such as diaphragms and cervical caps.  Sponge, Diaphragm, and Cervical Cap  All of these methods help prevent pregnancy by covering the opening of the uterus (cervix). This prevents sperm from passing through.  The sponge contains spermicide. It can be bought over the counter. The sponge must be left in place for at least 6 hours after the last time you have sex. However, it should not stay in place for more than 24 hours. It  should be discarded after it is used.  The diaphragm and cervical cap must be fitted and prescribed by your healthcare provider. Both are used with spermicide. The diaphragm must be left in place for at least 6 hours after sex. However, it should not stay in place for more than 24 hours. It can be washed and reused. The cervical cap must be left in place for at least 6 hours after sex. However, it should not stay in place for more than 48 hours. It can be washed and reused.  Withdrawal Method  This is when the man pulls his penis out of the vagina just before ejaculation (coming). This lowers the amount of sperm entering the vagina. Be aware that fluids released just before ejaculation often still contain some sperm, so this method is not as reliable as certain other methods.  Rhythm Method  This method requires that you know when in your menstrual cycle you are likely to become pregnant. Then, you avoid sex during those days. This requires careful planning and good discipline. Your healthcare provider can explain more about how this works.  Tubal Ligation and Vasectomy  These are surgical methods to prevent pregnancy. Tubal ligation is an option for women. The fallopian tubes are blocked or cut (ligated). This keeps the egg from passing into the uterus or sperm from reaching the egg. Vasectomy is an option for men. The tubes that normally carry sperm to the penis are either closed or blocked. Both tubal ligation and vasectomy are permanent both control methods. This means reversal is either not possible or unlikely to work. They are good choices for women and men who know that they do not want to have children in the future.  Date Last Reviewed: 6/11/2015 © 2000-2017 [employer]. [address] [place] [address], [place] [address]. All rights reserved. This information is not intended as a substitute for professional medical care. Always follow your healthcare professional's instructions.        How Birth  Control Works  Birth control prevents pregnancy by preventing conception. Some methods prevent an egg from maturing. Some keep the sperm and egg from meeting. And some methods work in both ways.  Preventing ovulation  Certain hormones help prevent an egg from maturing and being released. Hormone methods include:  · Birth control pills  · Skin patches  · Contraceptive vaginal rings  · Injections  Preventing sperm and egg from meeting  Methods that prevent the sperm and egg from joining include:  · Barrier methods, such as the condom, the diaphragm, and the cervical cap  · Spermicide  · The IUD (intrauterine device)  · Sterilization  · Natural family planning  · Some types of hormone methods  Date Last Reviewed: 3/1/2017  © 4225-1453 [employer]. [address] [place] [address], [place] [address]. All rights reserved. This information is not intended as a substitute for professional medical care. Always follow your healthcare professional's instructions.        Birth Control: Time-Release Hormones     Time-release hormones require a doctor's prescription.     Certain hormones can help prevent pregnancy. Hormones like the ones used in birth control pills can be taken in other forms. These must be prescribed by your healthcare provider. Because theres very little for you to do, you may find one of these methods easier to stick to than pills. Side effects for this method will vary depending on the type of time-release hormone you use. Talk to your healthcare provider for more information.  Pregnancy rates  Talk to your healthcare provider about the effectiveness of this birth control method.  Using time-release hormones  Methods to deliver hormones include:  · A skin patch placed on your stomach, buttocks, arm, or shoulder. You replace the patch weekly.  · A ring that you insert in your vagina, leave in for 3 weeks, and remove for 1 week.  · Injections given in your arm or buttocks once every 3 months by your  healthcare provider.  · An implant placed under the skin in the upper arm by your healthcare provider. This can be left in place for up to 3 years.  · The progestin IUDs placed by your healthcare provider. These can be left in place for 3 to 5 years depending on which one is chosen.  Pros  · Lowest pregnancy rate of the birth control methods that can be reversed  · No interruption to sex  · Easy to use  · Dont require taking a pill each day  · May decrease menstrual cramps, menstrual flow, and acne  Cons  · Do not protect against sexually transmitted infections (STIs)  · May cause irregular periods  · May cause side effects such as nausea, weight gain, headaches, breast tenderness, fatigue, or mood changes (these often go away within 3 months)  · May take up to a year for you to become fertile (able to get pregnant) after stopping injections  · May increase the risk of blood clots, heart attack, and stroke  Time-release hormones may not be for you  Time-release hormones may not be for you if:  · You are a smoker and over age 35  · You have high blood pressure or gallbladder, liver, certain lipid disorders, cerebrovascular disease (stroke), or heart disease  · You have diabetes, migraines, thromboembolic disorder (clot in vein or artery), lupus, or take medicines that may interfere with the hormones  In these cases, discuss the risks with your healthcare provider.  Date Last Reviewed: 3/1/2017  © 6468-1701 [employer]. [address] [place] [address]. All rights reserved. This information is not intended as a substitute for professional medical care. Always follow your healthcare professional's instructions.        Birth Control Choices  Birth control keeps you from getting pregnant during sex. There are many types of birth control. Some are more effective than others. New types are being tested all the time. Your healthcare provider can help you decide which type of birth control is best for  you. But no matter which type you choose, you and your partner must use it the right way each time you have sex. Some of the most common types are described below.  Condom  A condom is a thin covering that fits over the penis. (The female condom fits inside the vagina.) A condom catches sperm that come out of the penis during sex.  Spermicide  Spermicide is a gel, foam, cream, tablet, or sponge (although the sponge has barrier properties in addition to spermicidal properties). It is put in the vagina before sex to kill sperm.  Diaphragm and cervical cap  Diaphragms and cervical caps are round rubber cups that keep sperm out of the uterus. They also hold spermicide in place.  Intrauterine device (IUD)  An IUD is a small device that is placed in the uterus by a healthcare provider to prevent pregnancy.  The pill  The birth control pill is taken daily. It contains hormones that stop a womans body from releasing an egg each month.  Other hormones  Hormones that stop a womans egg from being released each month can be delivered in other ways. These include injection, implant, patch, or vaginal ring.  Other choices  Here are additional birth control methods:  · Male sterilization (vasectomy) is surgery that ties off or cuts the tubes called the vas deferens in the testes. This is done so sperm cannot come out when the man ejaculates.  · Female sterilization is surgery to block or cut the woman's fallopian tubes. It can be done by placing an instrument into the uterus (hysteroscopy) to insert small coils into the fallopian tubes (Essure). It can also be done through the belly (laparoscopy) to block the tubes or remove part or all of the tubes.  · Withdrawal method is when the male doesn't ejaculate into the vagina, but rather withdraws his penis just before he ejaculates.  · Fertility awareness method is when a woman keeps track of her fertile days. She only has intercourse at times when she is not likely to get  pregnant.  Emergency contraception (EC)  Emergency contraception can help prevent pregnancy after unprotected sex. Hormone pills (morning after pills) are available over the counter to anyone. A second type of EC, a copper IUD, needs to be inserted by a trained healthcare provider. Either type of EC can be used up to 5 days after sex, but it should be taken as soon as possible. The sooner it is used after unprotected sex, the more likely it is to be effective. EC will not work if youre already pregnant.  Things to consider  Think about the following:  · Choose a type of birth control that is easy for you to use.  · Read the package and follow your health care provider's instructions to learn to use your birth control the right way.  · Most forms of birth control do not protect you from sexually transmitted infections (STIs). To protect against STIs, always use a latex condom. If you are allergic to latex, a nonlatex condom may provide some protection.   Date Last Reviewed: 12/1/2016  © 7194-0425 [employer]. [address] [place] [address]. All rights reserved. This information is not intended as a substitute for professional medical care. Always follow your healthcare professional's instructions.        Birth Control: IUD (Intrauterine Device)    The IUD (intrauterine device) is small, flexible, and T-shaped. A trained healthcare provider places it in the uterus. The IUD is one of the most effective birth control methods. It is also reversible. This means it can be removed at any time by a trained healthcare provider. New IUDs are safe and do not have the risks of older types of IUDs.  Pregnancy rates  Talk to your healthcare provider about the effectiveness of this birth control method.  Types of IUDs  IUD insertion is done in the healthcare providers office. Two types of IUDs are available:  · The copper IUD releases a small amount of copper into the uterus. The copper makes it harder  for sperm to reach the egg. The device works for at least 10 years.  · The progestin IUD releases a hormone called progestin. It causes changes in the uterus to help prevent pregnancy. The device works for 3 to 5 years, depending on which device is chosen. It may be recommended for women who have anemia or heavy and painful periods.  IUDs have thin strings that hang from the opening of the uterus into the vagina. This lets you check that the IUD stays in place.  Things to know about IUDs  · IUDs can be used by women who have never been pregnant or by women with a history of sexually transmitted infections (STIs) or tubal pregnancy.  · It won't move from the uterus to any other part of the body.  · There is a slight risk of the device coming out of the vagina (expulsion).  · It may not work in women who have an abnormally shaped uterus.  · A copper IUD may cause heavier periods and cramping.  · Progestin IUD may cause light periods or no periods at all (irregular bleeding or spotting is possible and normal during first 3 to 6 months).  · If you get a sexually transmitted infection with an IUD in place, symptoms may be more severe.  What to report to your healthcare provider  Be sure your healthcare provider knows if you have:  · A sexually transmitted infection (STI) or possible STI  · Liver problems  · Blood clots (for progestin IUD only)  · Breast cancer or a history of breast cancer (progestin IUD only)   Date Last Reviewed: 3/1/2017  © 0897-1998 [employer]. [address] [place] [address], [place] [address]. All rights reserved. This information is not intended as a substitute for professional medical care. Always follow your healthcare professional's instructions.

## 2018-05-21 ENCOUNTER — PATIENT MESSAGE (OUTPATIENT)
Dept: OBSTETRICS AND GYNECOLOGY | Facility: CLINIC | Age: 23
End: 2018-05-21

## 2018-05-24 ENCOUNTER — ROUTINE PRENATAL (OUTPATIENT)
Dept: OBSTETRICS AND GYNECOLOGY | Facility: CLINIC | Age: 23
End: 2018-05-24
Payer: COMMERCIAL

## 2018-05-24 VITALS
WEIGHT: 157.88 LBS | BODY MASS INDEX: 23.65 KG/M2 | SYSTOLIC BLOOD PRESSURE: 104 MMHG | DIASTOLIC BLOOD PRESSURE: 62 MMHG

## 2018-05-24 DIAGNOSIS — Z72.0 TOBACCO ABUSE: ICD-10-CM

## 2018-05-24 DIAGNOSIS — F31.9 BIPOLAR 1 DISORDER: ICD-10-CM

## 2018-05-24 DIAGNOSIS — Z34.91 ENCOUNTER FOR SUPERVISION OF NORMAL PREGNANCY IN FIRST TRIMESTER, UNSPECIFIED GRAVIDITY: ICD-10-CM

## 2018-05-24 DIAGNOSIS — Q04.0: Primary | ICD-10-CM

## 2018-05-24 DIAGNOSIS — O34.219 HISTORY OF CESAREAN DELIVERY, CURRENTLY PREGNANT: ICD-10-CM

## 2018-05-24 PROCEDURE — 0502F SUBSEQUENT PRENATAL CARE: CPT | Mod: S$GLB,,, | Performed by: ADVANCED PRACTICE MIDWIFE

## 2018-05-24 PROCEDURE — 99999 PR PBB SHADOW E&M-EST. PATIENT-LVL II: CPT | Mod: PBBFAC,,, | Performed by: ADVANCED PRACTICE MIDWIFE

## 2018-05-28 NOTE — PROGRESS NOTES
"Doing well, reports fm and occ BH, denies vb or lof.   Reports adequate po hydration  Still requesting , is scheduled with MD next ov  Reports lower back occ "hurts", suggested maternity belt.   Discussed quiet hours at hospital.   MD visit next ov, scheduled  "

## 2018-06-06 ENCOUNTER — ROUTINE PRENATAL (OUTPATIENT)
Dept: OBSTETRICS AND GYNECOLOGY | Facility: CLINIC | Age: 23
End: 2018-06-06
Payer: COMMERCIAL

## 2018-06-06 VITALS
BODY MASS INDEX: 23.65 KG/M2 | DIASTOLIC BLOOD PRESSURE: 62 MMHG | SYSTOLIC BLOOD PRESSURE: 110 MMHG | WEIGHT: 157.88 LBS

## 2018-06-06 DIAGNOSIS — Z3A.33 PREGNANCY WITH 33 COMPLETED WEEKS GESTATION: ICD-10-CM

## 2018-06-06 DIAGNOSIS — Z98.891 H/O: C-SECTION: Primary | ICD-10-CM

## 2018-06-06 PROCEDURE — 0502F SUBSEQUENT PRENATAL CARE: CPT | Mod: S$GLB,,, | Performed by: OBSTETRICS & GYNECOLOGY

## 2018-06-06 PROCEDURE — 99999 PR PBB SHADOW E&M-EST. PATIENT-LVL II: CPT | Mod: PBBFAC,,, | Performed by: OBSTETRICS & GYNECOLOGY

## 2018-06-07 ENCOUNTER — ANESTHESIA (OUTPATIENT)
Dept: OBSTETRICS AND GYNECOLOGY | Facility: HOSPITAL | Age: 23
End: 2018-06-07
Payer: COMMERCIAL

## 2018-06-07 ENCOUNTER — HOSPITAL ENCOUNTER (INPATIENT)
Facility: HOSPITAL | Age: 23
LOS: 2 days | Discharge: HOME | End: 2018-06-09
Attending: OBSTETRICS & GYNECOLOGY | Admitting: OBSTETRICS & GYNECOLOGY
Payer: COMMERCIAL

## 2018-06-07 ENCOUNTER — ANESTHESIA EVENT (OUTPATIENT)
Dept: OBSTETRICS AND GYNECOLOGY | Facility: HOSPITAL | Age: 23
End: 2018-06-07
Payer: COMMERCIAL

## 2018-06-07 VITALS — OXYGEN SATURATION: 100 % | DIASTOLIC BLOOD PRESSURE: 98 MMHG | SYSTOLIC BLOOD PRESSURE: 114 MMHG

## 2018-06-07 DIAGNOSIS — O42.919 PRETERM PREMATURE RUPTURE OF MEMBRANES (PPROM) WITH UNKNOWN ONSET OF LABOR: ICD-10-CM

## 2018-06-07 DIAGNOSIS — O34.219 VBAC, DELIVERED: ICD-10-CM

## 2018-06-07 LAB
ABO + RH BLD: NORMAL
AMPHET+METHAMPHET UR QL: NEGATIVE
BARBITURATES UR QL SCN>200 NG/ML: NEGATIVE
BASOPHILS # BLD AUTO: 0.02 K/UL
BASOPHILS NFR BLD: 0.3 %
BENZODIAZ UR QL SCN>200 NG/ML: NEGATIVE
BILIRUB UR QL STRIP: NEGATIVE
BLD GP AB SCN CELLS X3 SERPL QL: NORMAL
BZE UR QL SCN: NEGATIVE
CANNABINOIDS UR QL SCN: NORMAL
CLARITY UR: CLEAR
COLOR UR: YELLOW
CREAT UR-MCNC: 147.9 MG/DL
DIFFERENTIAL METHOD: ABNORMAL
EOSINOPHIL # BLD AUTO: 0.2 K/UL
EOSINOPHIL NFR BLD: 2.8 %
ERYTHROCYTE [DISTWIDTH] IN BLOOD BY AUTOMATED COUNT: 12.7 %
GLUCOSE UR QL STRIP: NEGATIVE
HCT VFR BLD AUTO: 36.4 %
HGB BLD-MCNC: 12.7 G/DL
HGB UR QL STRIP: NEGATIVE
KETONES UR QL STRIP: NEGATIVE
LEUKOCYTE ESTERASE UR QL STRIP: NEGATIVE
LYMPHOCYTES # BLD AUTO: 2.1 K/UL
LYMPHOCYTES NFR BLD: 30.8 %
MCH RBC QN AUTO: 32.9 PG
MCHC RBC AUTO-ENTMCNC: 34.9 G/DL
MCV RBC AUTO: 94 FL
METHADONE UR QL SCN>300 NG/ML: NEGATIVE
MONOCYTES # BLD AUTO: 0.7 K/UL
MONOCYTES NFR BLD: 10.7 %
NEUTROPHILS # BLD AUTO: 3.7 K/UL
NEUTROPHILS NFR BLD: 55.4 %
NITRITE UR QL STRIP: NEGATIVE
OPIATES UR QL SCN: NEGATIVE
PCP UR QL SCN>25 NG/ML: NEGATIVE
PH UR STRIP: 7 [PH] (ref 5–8)
PLATELET # BLD AUTO: 199 K/UL
PMV BLD AUTO: 10.8 FL
PROT UR QL STRIP: NEGATIVE
RBC # BLD AUTO: 3.86 M/UL
SP GR UR STRIP: 1.01 (ref 1–1.03)
TOXICOLOGY INFORMATION: NORMAL
URN SPEC COLLECT METH UR: NORMAL
UROBILINOGEN UR STRIP-ACNC: NEGATIVE EU/DL
WBC # BLD AUTO: 6.72 K/UL

## 2018-06-07 PROCEDURE — 11000001 HC ACUTE MED/SURG PRIVATE ROOM

## 2018-06-07 PROCEDURE — 63600175 PHARM REV CODE 636 W HCPCS: Performed by: NURSE ANESTHETIST, CERTIFIED REGISTERED

## 2018-06-07 PROCEDURE — 87081 CULTURE SCREEN ONLY: CPT

## 2018-06-07 PROCEDURE — 80307 DRUG TEST PRSMV CHEM ANLYZR: CPT

## 2018-06-07 PROCEDURE — 85025 COMPLETE CBC W/AUTO DIFF WBC: CPT

## 2018-06-07 PROCEDURE — 25000003 PHARM REV CODE 250: Performed by: ADVANCED PRACTICE MIDWIFE

## 2018-06-07 PROCEDURE — 25000003 PHARM REV CODE 250: Performed by: NURSE ANESTHETIST, CERTIFIED REGISTERED

## 2018-06-07 PROCEDURE — 72200005 HC VAGINAL DELIVERY LEVEL II

## 2018-06-07 PROCEDURE — 87086 URINE CULTURE/COLONY COUNT: CPT

## 2018-06-07 PROCEDURE — 62326 NJX INTERLAMINAR LMBR/SAC: CPT | Performed by: ANESTHESIOLOGY

## 2018-06-07 PROCEDURE — 51702 INSERT TEMP BLADDER CATH: CPT

## 2018-06-07 PROCEDURE — 27800517 HC TRAY,EPIDURAL-CONTINUOUS: Performed by: NURSE ANESTHETIST, CERTIFIED REGISTERED

## 2018-06-07 PROCEDURE — 63600175 PHARM REV CODE 636 W HCPCS: Performed by: ADVANCED PRACTICE MIDWIFE

## 2018-06-07 PROCEDURE — 87491 CHLMYD TRACH DNA AMP PROBE: CPT

## 2018-06-07 PROCEDURE — 72100002 HC LABOR CARE, 1ST 8 HOURS

## 2018-06-07 PROCEDURE — 86850 RBC ANTIBODY SCREEN: CPT

## 2018-06-07 PROCEDURE — 81003 URINALYSIS AUTO W/O SCOPE: CPT | Mod: 59

## 2018-06-07 PROCEDURE — 72100003 HC LABOR CARE, EA. ADDL. 8 HRS

## 2018-06-07 RX ORDER — BUTORPHANOL TARTRATE 1 MG/ML
1 INJECTION INTRAMUSCULAR; INTRAVENOUS ONCE
Status: COMPLETED | OUTPATIENT
Start: 2018-06-07 | End: 2018-06-07

## 2018-06-07 RX ORDER — DOCUSATE SODIUM 100 MG/1
200 CAPSULE, LIQUID FILLED ORAL 2 TIMES DAILY PRN
Status: DISCONTINUED | OUTPATIENT
Start: 2018-06-07 | End: 2018-06-09 | Stop reason: HOSPADM

## 2018-06-07 RX ORDER — MAGNESIUM SULFATE HEPTAHYDRATE 40 MG/ML
2 INJECTION, SOLUTION INTRAVENOUS ONCE
Status: COMPLETED | OUTPATIENT
Start: 2018-06-07 | End: 2018-06-07

## 2018-06-07 RX ORDER — DIPHENHYDRAMINE HYDROCHLORIDE 50 MG/ML
25 INJECTION INTRAMUSCULAR; INTRAVENOUS EVERY 4 HOURS PRN
Status: DISCONTINUED | OUTPATIENT
Start: 2018-06-07 | End: 2018-06-09 | Stop reason: HOSPADM

## 2018-06-07 RX ORDER — ONDANSETRON 8 MG/1
8 TABLET, ORALLY DISINTEGRATING ORAL EVERY 8 HOURS PRN
Status: DISCONTINUED | OUTPATIENT
Start: 2018-06-07 | End: 2018-06-09 | Stop reason: HOSPADM

## 2018-06-07 RX ORDER — ROPIVACAINE HYDROCHLORIDE 2 MG/ML
INJECTION, SOLUTION EPIDURAL; INFILTRATION; PERINEURAL
Status: DISCONTINUED | OUTPATIENT
Start: 2018-06-07 | End: 2018-06-07

## 2018-06-07 RX ORDER — MAGNESIUM SULFATE HEPTAHYDRATE 40 MG/ML
2 INJECTION, SOLUTION INTRAVENOUS CONTINUOUS
Status: DISCONTINUED | OUTPATIENT
Start: 2018-06-07 | End: 2018-06-07

## 2018-06-07 RX ORDER — ROPIVACAINE HYDROCHLORIDE 2 MG/ML
INJECTION, SOLUTION EPIDURAL; INFILTRATION; PERINEURAL CONTINUOUS PRN
Status: DISCONTINUED | OUTPATIENT
Start: 2018-06-07 | End: 2018-06-07

## 2018-06-07 RX ORDER — AMOXICILLIN 500 MG/1
500 CAPSULE ORAL EVERY 8 HOURS
Status: DISCONTINUED | OUTPATIENT
Start: 2018-06-09 | End: 2018-06-07

## 2018-06-07 RX ORDER — PHENYLEPHRINE HYDROCHLORIDE 10 MG/ML
INJECTION INTRAVENOUS
Status: DISCONTINUED | OUTPATIENT
Start: 2018-06-07 | End: 2018-06-07

## 2018-06-07 RX ORDER — HYDROCORTISONE 25 MG/G
CREAM TOPICAL 3 TIMES DAILY PRN
Status: DISCONTINUED | OUTPATIENT
Start: 2018-06-07 | End: 2018-06-09 | Stop reason: HOSPADM

## 2018-06-07 RX ORDER — MAG HYDROX/ALUMINUM HYD/SIMETH 200-200-20
30 SUSPENSION, ORAL (FINAL DOSE FORM) ORAL
Status: DISCONTINUED | OUTPATIENT
Start: 2018-06-07 | End: 2018-06-07

## 2018-06-07 RX ORDER — ACETAMINOPHEN 325 MG/1
650 TABLET ORAL EVERY 6 HOURS PRN
Status: DISCONTINUED | OUTPATIENT
Start: 2018-06-07 | End: 2018-06-09 | Stop reason: HOSPADM

## 2018-06-07 RX ORDER — SODIUM CHLORIDE, SODIUM LACTATE, POTASSIUM CHLORIDE, CALCIUM CHLORIDE 600; 310; 30; 20 MG/100ML; MG/100ML; MG/100ML; MG/100ML
INJECTION, SOLUTION INTRAVENOUS CONTINUOUS
Status: DISCONTINUED | OUTPATIENT
Start: 2018-06-07 | End: 2018-06-07

## 2018-06-07 RX ORDER — AZITHROMYCIN 250 MG/1
1000 TABLET, FILM COATED ORAL ONCE
Status: COMPLETED | OUTPATIENT
Start: 2018-06-07 | End: 2018-06-07

## 2018-06-07 RX ORDER — OXYTOCIN/RINGER'S LACTATE 20/1000 ML
41.65 PLASTIC BAG, INJECTION (ML) INTRAVENOUS CONTINUOUS
Status: ACTIVE | OUTPATIENT
Start: 2018-06-07 | End: 2018-06-08

## 2018-06-07 RX ORDER — HYDROCODONE BITARTRATE AND ACETAMINOPHEN 5; 325 MG/1; MG/1
1 TABLET ORAL EVERY 4 HOURS PRN
Status: DISCONTINUED | OUTPATIENT
Start: 2018-06-07 | End: 2018-06-09 | Stop reason: HOSPADM

## 2018-06-07 RX ORDER — BETAMETHASONE SODIUM PHOSPHATE AND BETAMETHASONE ACETATE 3; 3 MG/ML; MG/ML
12 INJECTION, SUSPENSION INTRA-ARTICULAR; INTRALESIONAL; INTRAMUSCULAR; SOFT TISSUE
Status: DISCONTINUED | OUTPATIENT
Start: 2018-06-07 | End: 2018-06-07

## 2018-06-07 RX ORDER — LIDOCAINE HYDROCHLORIDE AND EPINEPHRINE 15; 5 MG/ML; UG/ML
INJECTION, SOLUTION EPIDURAL
Status: DISCONTINUED | OUTPATIENT
Start: 2018-06-07 | End: 2018-06-07

## 2018-06-07 RX ORDER — IBUPROFEN 600 MG/1
600 TABLET ORAL EVERY 6 HOURS
Status: DISCONTINUED | OUTPATIENT
Start: 2018-06-08 | End: 2018-06-09 | Stop reason: HOSPADM

## 2018-06-07 RX ORDER — CALCIUM GLUCONATE 98 MG/ML
1 INJECTION, SOLUTION INTRAVENOUS
Status: DISCONTINUED | OUTPATIENT
Start: 2018-06-07 | End: 2018-06-07

## 2018-06-07 RX ORDER — DIPHENHYDRAMINE HCL 25 MG
25 CAPSULE ORAL EVERY 4 HOURS PRN
Status: DISCONTINUED | OUTPATIENT
Start: 2018-06-07 | End: 2018-06-09 | Stop reason: HOSPADM

## 2018-06-07 RX ADMIN — SODIUM CHLORIDE, SODIUM LACTATE, POTASSIUM CHLORIDE, AND CALCIUM CHLORIDE: .6; .31; .03; .02 INJECTION, SOLUTION INTRAVENOUS at 04:06

## 2018-06-07 RX ADMIN — LIDOCAINE HYDROCHLORIDE,EPINEPHRINE BITARTRATE 3 ML: 15; .005 INJECTION, SOLUTION EPIDURAL; INFILTRATION; INTRACAUDAL; PERINEURAL at 05:06

## 2018-06-07 RX ADMIN — ALUMINUM HYDROXIDE, MAGNESIUM HYDROXIDE, AND SIMETHICONE 30 ML: 200; 200; 20 SUSPENSION ORAL at 11:06

## 2018-06-07 RX ADMIN — BUTORPHANOL TARTRATE 1 MG: 1 INJECTION, SOLUTION INTRAMUSCULAR; INTRAVENOUS at 03:06

## 2018-06-07 RX ADMIN — MAGNESIUM SULFATE HEPTAHYDRATE 2 G/HR: 40 INJECTION, SOLUTION INTRAVENOUS at 02:06

## 2018-06-07 RX ADMIN — HYDROCODONE BITARTRATE AND ACETAMINOPHEN 1 TABLET: 5; 325 TABLET ORAL at 08:06

## 2018-06-07 RX ADMIN — SODIUM CHLORIDE, SODIUM LACTATE, POTASSIUM CHLORIDE, AND CALCIUM CHLORIDE: .6; .31; .03; .02 INJECTION, SOLUTION INTRAVENOUS at 02:06

## 2018-06-07 RX ADMIN — ROPIVACAINE HYDROCHLORIDE 8 ML: 2 INJECTION, SOLUTION EPIDURAL; INFILTRATION at 05:06

## 2018-06-07 RX ADMIN — MAGNESIUM SULFATE IN WATER 2 G: 40 INJECTION, SOLUTION INTRAVENOUS at 02:06

## 2018-06-07 RX ADMIN — PHENYLEPHRINE HYDROCHLORIDE 100 MCG: 10 INJECTION INTRAVENOUS at 11:06

## 2018-06-07 RX ADMIN — AMPICILLIN SODIUM 2 G: 2 INJECTION, POWDER, FOR SOLUTION INTRAMUSCULAR; INTRAVENOUS at 09:06

## 2018-06-07 RX ADMIN — ROPIVACAINE HYDROCHLORIDE 14 ML/HR: 2 INJECTION, SOLUTION EPIDURAL; INFILTRATION at 05:06

## 2018-06-07 RX ADMIN — IBUPROFEN 600 MG: 600 TABLET ORAL at 08:06

## 2018-06-07 RX ADMIN — BETAMETHASONE SODIUM PHOSPHATE AND BETAMETHASONE ACETATE 12 MG: 3; 3 INJECTION, SUSPENSION INTRA-ARTICULAR; INTRALESIONAL; INTRAMUSCULAR at 02:06

## 2018-06-07 RX ADMIN — ROPIVACAINE HYDROCHLORIDE 7 ML: 2 INJECTION, SOLUTION EPIDURAL; INFILTRATION at 05:06

## 2018-06-07 RX ADMIN — BUTORPHANOL TARTRATE 1 MG: 1 INJECTION, SOLUTION INTRAMUSCULAR; INTRAVENOUS at 02:06

## 2018-06-07 RX ADMIN — SODIUM CHLORIDE, SODIUM LACTATE, POTASSIUM CHLORIDE, AND CALCIUM CHLORIDE: .6; .31; .03; .02 INJECTION, SOLUTION INTRAVENOUS at 11:06

## 2018-06-07 RX ADMIN — AMPICILLIN SODIUM 2 G: 2 INJECTION, POWDER, FOR SOLUTION INTRAMUSCULAR; INTRAVENOUS at 02:06

## 2018-06-07 RX ADMIN — AZITHROMYCIN 1000 MG: 250 TABLET, FILM COATED ORAL at 02:06

## 2018-06-07 RX ADMIN — AMPICILLIN SODIUM 2 G: 2 INJECTION, POWDER, FOR SOLUTION INTRAMUSCULAR; INTRAVENOUS at 03:06

## 2018-06-07 RX ADMIN — ROPIVACAINE HYDROCHLORIDE: 2 INJECTION, SOLUTION EPIDURAL; INFILTRATION at 11:06

## 2018-06-07 NOTE — H&P
[facility] -   Obstetrics  History & Physical    Patient Name: Maria Isabel Hughes  MRN: [id number]  Admission Date: 2018  Primary Care Provider: Janes Feliz MD    Subjective:     Principal Problem: premature rupture of membranes (PPROM) with unknown onset of labor    History of Present Illness:  22yo  ESTRELLA 17 EGA 33w2d arrived c/o a gush of fluid at 0100 AM, clear fluid noted, previous vaginal delivery at 36w5d then a c/s at 37wks secondary to breech presentation, desires TOLAC if delivers. Pt was followed by MFM for absence of corpus callosum-fetal Pt was followed by MFM for absence of corpus callosum-fetal    Obstetric HPI:      This pregnancy has been complicated by previous 36w5 day delivery, previous c/s secondary to breech    Obstetric History       T1      L2     SAB1   TAB0   Ectopic0   Multiple0   Live Births2       # Outcome Date GA Lbr Hema/2nd Weight Sex Delivery Anes PTL Lv   5 Current            4 Term / 37w0d  2.68 kg (5 lb 14.5 oz) F CS-LTranv Spinal  LUIS      Name: Mukesh      Complications: Breech presentation      Apgar1:  8                Apgar5: 9   3  /15/15 36w5d 08:05 / 00:16 2.98 kg (6 lb 9.1 oz) F Vag-Spont EPI N LUIS      Apgar1:  9   2 SAB            1 AB                 Past Medical History:   Diagnosis Date    Mental disorder     Postpartum depression      Past Surgical History:   Procedure Laterality Date     SECTION      x 1    KNEE SURGERY      right knee surgery         No prescriptions prior to admission.       Review of patient's allergies indicates:  No Known Allergies     Family History     Problem Relation (Age of Onset)    Cancer Maternal Grandmother, Mother    Diabetes Maternal Grandmother, Maternal Grandfather    Hypertension Maternal Grandmother, Maternal Grandfather, Mother        Social History Main Topics    Smoking status: Current Every Day Smoker     Packs/day: 0.25     Years: 6.00     Types:  Cigarettes    Smokeless tobacco: Never Used    Alcohol use No    Drug use: No    Sexual activity: Yes     Partners: Male     Birth control/ protection: None      Comment: one partner     Review of Systems   All other systems reviewed and are negative.     Objective:     Vital Signs (Most Recent):    Vital Signs (24h Range):  BP: (110)/(62) 110/62        There is no height or weight on file to calculate BMI.    FHT: Cat 2 (reassuring)  TOCO:  Q 2-3 minutes    Physical Exam:   Constitutional: She is oriented to person, place, and time. She appears well-developed and well-nourished.      Neck: Normal range of motion.          Abdominal: Soft.     Genitourinary: Vagina normal.           Musculoskeletal: Normal range of motion.       Neurological: She is alert and oriented to person, place, and time. She has normal reflexes.    Skin: Skin is warm and dry.    Psychiatric: She has a normal mood and affect. Her behavior is normal.       Cervix:  Dilation:  4  Clear fluid noted  Effacement:  80.  Station: -1  Presentation: Vertex     Significant Labs:  Lab Results   Component Value Date    GROUPTRH O POS 11/15/2017    HEPBSAG Negative 11/15/2017    HEPBSAG Negative 11/15/2017    STREPBCULT No Group B Streptococcus isolated 2016       I have personallly reviewed all pertinent lab results from the last 24 hours.    Assessment/Plan:     23 y.o. female  at 33w2d for:    *  premature rupture of membranes (PPROM) with unknown onset of labor    PPROM @ 0100 today, clear fluid, RUC, will start magnesium sulfate, BMZ series, antibiotics, cultures collected  US in AM for EFW        History of  delivery, currently pregnant    1 vaginal birth, then LTCS for breech presentation, desires TOLAC        Congenital absence of corpus callosum    Noted on US and MRI, followed by GAURAV Guzman, STEPHEN  Obstetrics  [facility] [place]

## 2018-06-07 NOTE — HPI
22yo  ESTRELLA 17 EGA 33w2d arrived c/o a gush of fluid at 0100 AM, clear fluid noted, previous vaginal delivery at 36w5d then a c/s at 37wks secondary to breech presentation, desires TOLAC if delivers. Pt was followed by MFM for absence of corpus callosum-fetal

## 2018-06-07 NOTE — PROGRESS NOTES
[facility] -   Obstetrics  Labor Progress Note    Patient Name: Maria Isabel Hughes  MRN: [id number]  Admission Date: 2018  Hospital Length of Stay: 0 days  Attending Physician: Janes Feliz MD  Primary Care Provider: Janes Feliz MD    Subjective:     Principal Problem: premature rupture of membranes (PPROM) with unknown onset of labor    Hospital Course:  Admitted with PPROM, 4 cm with RUC, abx, MgSO4, BMZ, cultures sent    Interval History:  Maria Isabel is a 23 y.o.  at 33w2d. She is doing well. pprom on MGSO4 AT 1GM/HR , second betamethasone administraion scheduled at 0218 18.     Objective:     Vital Signs (Most Recent):  Temp: 98.5 °F (36.9 °C) (18 1301)  Pulse: 91 (18 1302)  Resp: 16 (18 1301)  BP: (!) 101/53 (18 1302)  SpO2: 100 % (18 1301) Vital Signs (24h Range):  Temp:  [97.2 °F (36.2 °C)-98.5 °F (36.9 °C)] 98.5 °F (36.9 °C)  Pulse:  [] 91  Resp:  [14-20] 16  SpO2:  [93 %-100 %] 100 %  BP: ()/(29-98) 101/53     Weight: 69.9 kg (154 lb)  Body mass index is 23.42 kg/m².    FHT: 130Cat 1 (reassuring)  TOCO:  Q 2-7 minutes    Cervical Exam:Deferred   Dilation:    Effacement:    Station:   Presentation:      Significant Labs:  Lab Results   Component Value Date    GROUPTRH O POS 2018    HEPBSAG Negative 11/15/2017    HEPBSAG Negative 11/15/2017    STREPBCULT No Group B Streptococcus isolated 2016       I have personallly reviewed all pertinent lab results from the last 24 hours.    Physical Exam:   Constitutional: She is oriented to person, place, and time. She appears well-developed and well-nourished.    HENT:   Head: Normocephalic.    Eyes: EOM are normal. Pupils are equal, round, and reactive to light.    Neck: Normal range of motion.    Cardiovascular: Normal rate, regular rhythm and normal heart sounds.     Pulmonary/Chest: Effort normal.        Abdominal: Soft.     Genitourinary: Vagina normal.           Musculoskeletal:    LINDSEY IN PLACE        Neurological: She is alert and oriented to person, place, and time.    Skin: Skin is warm and dry.    Psychiatric: She has a normal mood and affect. Her behavior is normal. Judgment and thought content normal.       Assessment/Plan:     23 y.o. female  at 33w2d for:    *  premature rupture of membranes (PPROM) with unknown onset of labor    PPROM @ 0100 today, clear fluid, RUC, will start magnesium sulfate, BMZ series, antibiotics, cultures collected  US in AM for EFW        History of  delivery, currently pregnant    1 vaginal birth, then LTCS for breech presentation, desires TOLAC        Congenital absence of corpus callosum    Noted on US and MRI, followed by GAURAV Nelson CNM  Obstetrics  [facility]

## 2018-06-07 NOTE — PROGRESS NOTES
S: Resting quietly  O:  VS reviewed, afebrile   Vitals:    18 1256 18 1300 18 1301 18 1302   BP:   (!) 94/34 (!) 101/53   Pulse: 92 (!) 113 (!) 114 91   Resp:   16    Temp:   98.5 °F (36.9 °C)    TempSrc:       SpO2: 100%  100%    Weight:       Height:       B/P's being followed by anesthesia  Magnesium Sulfate decreased to 1gm/hr for pt hypotension      FHTs: 120, cat 1   UC: occ - 5-7 min pt denies feeling ctx     A: IUP @ 33w2d ;     Patient Active Problem List   Diagnosis    Bipolar 1 disorder    Congenital absence of corpus callosum    Encounter for supervision of normal pregnancy in first trimester    Tobacco abuse    History of  delivery, currently pregnant     premature rupture of membranes (PPROM) with unknown onset of labor       P: Continue PPROM protocol/abx  Magnesium Sulfate 1 gm/hr  Close monitoring  LINDSEY in place

## 2018-06-07 NOTE — ANESTHESIA PREPROCEDURE EVALUATION
06/07/2018  Maria Isabel Hughes is a 23 y.o., female.    Anesthesia Evaluation    I have reviewed the Patient Summary Reports.    I have reviewed the Nursing Notes.   I have reviewed the Medications.     Review of Systems  Anesthesia Hx:  No problems with previous Anesthesia    Social:  No Alcohol Use, Smoker 1 pack per week   Hematology/Oncology:  Hematology Normal   Oncology Normal     EENT/Dental:EENT/Dental Normal   Cardiovascular:  Cardiovascular Normal     Pulmonary:  Pulmonary Normal    Renal/:  Renal/ Normal     Hepatic/GI:  Hepatic/GI Normal    Musculoskeletal:  Musculoskeletal Normal    Neurological:  Neurology Normal    Endocrine:  Endocrine Normal    Dermatological:  Skin Normal    Psych:   Psychiatric History anxiety depression bipolar         Physical Exam  General:  Well nourished    Airway/Jaw/Neck:  Airway Findings: Mouth Opening: Normal Tongue: Normal  General Airway Assessment: Adult  Mallampati: II  TM Distance: Normal, at least 6 cm  Jaw/Neck Findings:  Neck ROM: Normal ROM      Dental:  Dental Findings: In tact   Chest/Lungs:  Chest/Lungs Findings: Clear to auscultation, Normal Respiratory Rate     Heart/Vascular:  Heart Findings: Rate: Normal  Rhythm: Regular Rhythm  Sounds: Normal     Abdomen:  Abdomen Findings:  Normal       Mental Status:  Mental Status Findings:  Cooperative, Alert and Oriented         Anesthesia Plan  Type of Anesthesia, risks & benefits discussed:  Anesthesia Type:  epidural, general  Patient's Preference:   Intra-op Monitoring Plan: standard ASA monitors  Intra-op Monitoring Plan Comments:   Post Op Pain Control Plan:   Post Op Pain Control Plan Comments:   Induction:   IV  Beta Blocker:  Patient is not currently on a Beta-Blocker (No further documentation required).       Informed Consent: Patient understands risks and agrees with Anesthesia plan.  Questions  answered. Anesthesia consent signed with patient.  ASA Score: 2     Day of Surgery Review of History & Physical: I have interviewed and examined the patient. I have reviewed the patient's H&P dated: 06/07/2018. There are no significant changes.          Ready For Surgery From Anesthesia Perspective.

## 2018-06-07 NOTE — ANESTHESIA PROCEDURE NOTES
Epidural    Patient location during procedure: OB   Reason for block: primary anesthetic   Diagnosis: IUP   Start time: 6/7/2018 5:34 AM  Timeout: 6/7/2018 5:31 AM  End time: 6/7/2018 5:42 AM  Surgery related to: labor pain  Staffing  Anesthesiologist: ELVA BOOKER  Performed: anesthesiologist   Preanesthetic Checklist  Completed: patient identified, surgical consent, pre-op evaluation, timeout performed, IV checked, risks and benefits discussed, monitors and equipment checked, anesthesia consent given, hand hygiene performed and patient being monitored  Preparation  Patient position: sitting  Prep: Betadine  Patient monitoring: Pulse Ox and Blood Pressure  Epidural  Skin Anesthetic: lidocaine 1%  Skin Wheal: 3 mL  Administration type: continuous  Approach: midline  Interspace: L4-5  Injection technique: KYM saline  Needle and Epidural Catheter  Needle type: Exchangeryy   Needle gauge: 17  Needle length: 3.5 inches  Needle insertion depth: 4 cm  Catheter type: Vidimax  Catheter size: 19 G  Catheter at skin depth: 11 cm  Test dose: 3 mL of lidocaine 1.5% with Epi 1-to-200,000  Additional Documentation: incremental injection, negative aspiration for heme and CSF, no signs/symptoms of IV or SA injection, no paresthesia on injection, no significant pain on injection and no significant complaints from patient  Needle localization: anatomical landmarks  Medications:  Bolus administered: 15 mL of 0.2% ropivacaine  Epinephrine added: none  Volume per aspiration: 3 mL  Time between aspirations: 0.2 minutes  Assessment  Upper dermatomal levels - Left: T11  Right: T11  Lower dermatomal level: N/A   Dermatomal levels determined by alcohol wipe  Ease of block: easy  Patient's tolerance of the procedure: comfortable throughout block and no complaints  Post dural Puncture Headache?: No

## 2018-06-07 NOTE — NURSING
Pt BP low, asymptomatic, turned to right side, lungs clear, reflexes normal. LR bolus give, CNM notified magnesium turned down to 1g per  Verbal order.

## 2018-06-07 NOTE — SUBJECTIVE & OBJECTIVE
Interval History:  Maria Isabel is a 23 y.o.  at 33w2d. She is doing well. pprom on MGSO4 AT 1GM/HR , second betamethasone administraion scheduled at 0218 18.     Objective:     Vital Signs (Most Recent):  Temp: 98.5 °F (36.9 °C) (18 1301)  Pulse: 91 (18 1302)  Resp: 16 (18 1301)  BP: (!) 101/53 (18 1302)  SpO2: 100 % (18 1301) Vital Signs (24h Range):  Temp:  [97.2 °F (36.2 °C)-98.5 °F (36.9 °C)] 98.5 °F (36.9 °C)  Pulse:  [] 91  Resp:  [14-20] 16  SpO2:  [93 %-100 %] 100 %  BP: ()/(29-98) 101/53     Weight: 69.9 kg (154 lb)  Body mass index is 23.42 kg/m².    FHT: 130Cat 1 (reassuring)  TOCO:  Q 2-7 minutes    Cervical Exam:Deferred   Dilation:    Effacement:    Station:   Presentation:      Significant Labs:  Lab Results   Component Value Date    GROUPTRH O POS 2018    HEPBSAG Negative 11/15/2017    HEPBSAG Negative 11/15/2017    STREPBCULT No Group B Streptococcus isolated 2016       I have personallly reviewed all pertinent lab results from the last 24 hours.    Physical Exam:   Constitutional: She is oriented to person, place, and time. She appears well-developed and well-nourished.    HENT:   Head: Normocephalic.    Eyes: EOM are normal. Pupils are equal, round, and reactive to light.    Neck: Normal range of motion.    Cardiovascular: Normal rate, regular rhythm and normal heart sounds.     Pulmonary/Chest: Effort normal.        Abdominal: Soft.     Genitourinary: Vagina normal.           Musculoskeletal:   LINDSEY IN PLACE        Neurological: She is alert and oriented to person, place, and time.    Skin: Skin is warm and dry.    Psychiatric: She has a normal mood and affect. Her behavior is normal. Judgment and thought content normal.

## 2018-06-07 NOTE — ASSESSMENT & PLAN NOTE
PPROM @ 0100 today, clear fluid, RUC, will start magnesium sulfate, BMZ series, antibiotics, cultures collected  US in AM for EFW

## 2018-06-07 NOTE — PLAN OF CARE
Problem: Patient Care Overview  Goal: Plan of Care Review  Outcome: Ongoing (interventions implemented as appropriate)  Pt Bp's monitored q15 min, currently asymptomatic. UO adequate,  Roel 2 in 30 minutes. Denies pain. 2nd dose of steriods due at 0200 6/8.

## 2018-06-07 NOTE — HOSPITAL COURSE
Admitted with PPROM, 4 cm with RUC, abx, MgSO4, BMZ, cultures sent  6/8/18 Routine pp care   6/9/18--Normal PP course, d/c home today, pumping, baby in NICU

## 2018-06-07 NOTE — SUBJECTIVE & OBJECTIVE
Obstetric HPI:      This pregnancy has been complicated by previous 36w5 day delivery, previous c/s secondary to breech    Obstetric History       T1      L2     SAB1   TAB0   Ectopic0   Multiple0   Live Births2       # Outcome Date GA Lbr Hema/2nd Weight Sex Delivery Anes PTL Lv   5 Current            4 Term / 37w0d  2.68 kg (5 lb 14.5 oz) F CS-LTranv Spinal  LUIS      Name: Mukesh      Complications: Breech presentation      Apgar1:  8                Apgar5: 9   3  04/15/15 36w5d 08:05 / 00:16 2.98 kg (6 lb 9.1 oz) F Vag-Spont EPI N LUIS      Apgar1:  9   2 SAB            1 AB                 Past Medical History:   Diagnosis Date    Mental disorder     Postpartum depression      Past Surgical History:   Procedure Laterality Date     SECTION      x 1    KNEE SURGERY      right knee surgery         No prescriptions prior to admission.       Review of patient's allergies indicates:  No Known Allergies     Family History     Problem Relation (Age of Onset)    Cancer Maternal Grandmother, Mother    Diabetes Maternal Grandmother, Maternal Grandfather    Hypertension Maternal Grandmother, Maternal Grandfather, Mother        Social History Main Topics    Smoking status: Current Every Day Smoker     Packs/day: 0.25     Years: 6.00     Types: Cigarettes    Smokeless tobacco: Never Used    Alcohol use No    Drug use: No    Sexual activity: Yes     Partners: Male     Birth control/ protection: None      Comment: one partner     Review of Systems   All other systems reviewed and are negative.     Objective:     Vital Signs (Most Recent):    Vital Signs (24h Range):  BP: (110)/(62) 110/62        There is no height or weight on file to calculate BMI.    FHT: Cat 2 (reassuring)  TOCO:  Q 2-3 minutes    Physical Exam:   Constitutional: She is oriented to person, place, and time. She appears well-developed and well-nourished.      Neck: Normal range of motion.          Abdominal: Soft.      Genitourinary: Vagina normal.           Musculoskeletal: Normal range of motion.       Neurological: She is alert and oriented to person, place, and time. She has normal reflexes.    Skin: Skin is warm and dry.    Psychiatric: She has a normal mood and affect. Her behavior is normal.       Cervix:  Dilation:  4  Clear fluid noted  Effacement:  80.  Station: -1  Presentation: Vertex     Significant Labs:  Lab Results   Component Value Date    GROUPTRH O POS 11/15/2017    HEPBSAG Negative 11/15/2017    HEPBSAG Negative 11/15/2017    STREPBCULT No Group B Streptococcus isolated 03/14/2016       I have personallly reviewed all pertinent lab results from the last 24 hours.

## 2018-06-08 LAB
BACTERIA UR CULT: NO GROWTH
C TRACH DNA SPEC QL NAA+PROBE: NOT DETECTED
N GONORRHOEA DNA SPEC QL NAA+PROBE: NOT DETECTED

## 2018-06-08 PROCEDURE — 25000003 PHARM REV CODE 250: Performed by: ADVANCED PRACTICE MIDWIFE

## 2018-06-08 PROCEDURE — 11000001 HC ACUTE MED/SURG PRIVATE ROOM

## 2018-06-08 PROCEDURE — 99231 SBSQ HOSP IP/OBS SF/LOW 25: CPT | Mod: ,,, | Performed by: ADVANCED PRACTICE MIDWIFE

## 2018-06-08 RX ORDER — SERTRALINE HYDROCHLORIDE 25 MG/1
25 TABLET, FILM COATED ORAL DAILY
Status: DISCONTINUED | OUTPATIENT
Start: 2018-06-08 | End: 2018-06-09 | Stop reason: HOSPADM

## 2018-06-08 RX ADMIN — HYDROCODONE BITARTRATE AND ACETAMINOPHEN 1 TABLET: 5; 325 TABLET ORAL at 07:06

## 2018-06-08 RX ADMIN — IBUPROFEN 600 MG: 600 TABLET ORAL at 07:06

## 2018-06-08 RX ADMIN — SERTRALINE HYDROCHLORIDE 25 MG: 25 TABLET ORAL at 07:06

## 2018-06-08 RX ADMIN — IBUPROFEN 600 MG: 600 TABLET ORAL at 12:06

## 2018-06-08 RX ADMIN — HYDROCODONE BITARTRATE AND ACETAMINOPHEN 1 TABLET: 5; 325 TABLET ORAL at 03:06

## 2018-06-08 RX ADMIN — HYDROCODONE BITARTRATE AND ACETAMINOPHEN 1 TABLET: 5; 325 TABLET ORAL at 10:06

## 2018-06-08 RX ADMIN — IBUPROFEN 600 MG: 600 TABLET ORAL at 06:06

## 2018-06-08 NOTE — SUBJECTIVE & OBJECTIVE
Hospital course: Admitted with PPROM, 4 cm with RUC, abx, MgSO4, BMZ, cultures sent  6/8/18 Routine pp care     Interval History:     She is doing well this morning. She is tolerating a regular diet without nausea or vomiting. She is voiding spontaneously. She is ambulating. She has passed flatus, and has not a BM. Vaginal bleeding is mild. She denies fever or chills. Abdominal pain is mild and controlled with oral medications. She is pumping dt NICU admit. She desires circumcision for her male baby: not applicable.    Objective:     Vital Signs (Most Recent):  Temp: 98.9 °F (37.2 °C) (06/08/18 0705)  Pulse: 66 (06/08/18 1015)  Resp: 18 (06/08/18 0705)  BP: (!) 104/58 (06/08/18 1015)  SpO2: 100 % (06/07/18 2136) Vital Signs (24h Range):  Temp:  [98.5 °F (36.9 °C)-99.8 °F (37.7 °C)] 98.9 °F (37.2 °C)  Pulse:  [] 66  Resp:  [16-18] 18  SpO2:  [98 %-100 %] 100 %  BP: ()/(29-86) 104/58     Weight: 69.9 kg (154 lb)  Body mass index is 23.42 kg/m².      Intake/Output Summary (Last 24 hours) at 06/08/18 1036  Last data filed at 06/08/18 0300   Gross per 24 hour   Intake              525 ml   Output             4340 ml   Net            -3815 ml       Significant Labs:  Lab Results   Component Value Date    GROUPTRH O POS 06/07/2018    HEPBSAG Negative 11/15/2017    HEPBSAG Negative 11/15/2017    STREPBCULT Normal cervicovaginal bravo present 06/07/2018       Recent Labs  Lab 06/07/18  0225   HGB 12.7   HCT 36.4*       I have personallly reviewed all pertinent lab results from the last 24 hours.    Physical Exam:   Constitutional: She is oriented to person, place, and time. She appears well-developed and well-nourished.    HENT:   Head: Normocephalic.    Eyes: Pupils are equal, round, and reactive to light.    Neck: Normal range of motion.    Cardiovascular: Normal rate, regular rhythm and normal heart sounds.     Pulmonary/Chest: Effort normal.        Abdominal: Soft.     Genitourinary: Vagina normal and uterus  normal.           Musculoskeletal: Normal range of motion.       Neurological: She is alert and oriented to person, place, and time.    Skin: Skin is warm and dry.    Psychiatric: She has a normal mood and affect. Her behavior is normal. Judgment and thought content normal.

## 2018-06-08 NOTE — NURSING
Hand expressed and pumped 2 ml breastmilk. Collected in syringe, labeled, and transported to NICU fridge.

## 2018-06-08 NOTE — TRANSFER OF CARE
"Anesthesia Transfer of Care Note    Patient: Maria Isabel Hughes    Procedure(s) Performed: epidural    Patient location: Labor and Delivery    Anesthesia Type: epidural    Post pain: adequate analgesia    Post assessment: no apparent anesthetic complications    Post vital signs: stable    Level of consciousness: awake    Nausea/Vomiting: no nausea/vomiting    Complications: none    Transfer of care protocol was followed      Last vitals:   Visit Vitals  BP (!) 99/43   Pulse 75   Temp 37.7 °C (99.8 °F) (Axillary)   Resp 18   Ht 5' 8" (1.727 m)   Wt 69.9 kg (154 lb)   LMP 10/17/2017 (Exact Date)   SpO2 100%   Breastfeeding? Unknown   BMI 23.42 kg/m²     "

## 2018-06-08 NOTE — NURSING
[employer] Symphony pump at bedside. Instructed on proper usage and to adjust suction according to comfort level. Verified appropriate flange fit. Educated mother on frequency and duration of pumping in order to promote and maintain full milk supply. Hands on pumping technique reviewed. Encouraged hand expression after. Instructed mother on cleaning of breast pump parts. Reviewed proper milk handling, collection, storage, and transportation. Voices understanding.    Pt. Pumped for 10 min and Hand expressed 3ml.  Pt stated she was tired and wanted to get sleep and will try again later once she is rested. Pt educated on early pumping and benefits of breastfeeding for  infant.

## 2018-06-08 NOTE — ANESTHESIA POSTPROCEDURE EVALUATION
"Anesthesia Post Evaluation    Patient: Maria Isabel Hughes    Procedure(s) Performed: epidural    Final Anesthesia Type: epidural  Patient location during evaluation: labor & delivery  Patient participation: Yes- Able to Participate  Level of consciousness: awake and alert  Post-procedure vital signs: reviewed and stable  Pain management: adequate  Airway patency: patent  PONV status at discharge: No PONV  Anesthetic complications: no      Cardiovascular status: hemodynamically stable  Respiratory status: spontaneous ventilation, room air and unassisted  Hydration status: euvolemic  Follow-up not needed.        Visit Vitals  BP (!) 99/43   Pulse 75   Temp 37.7 °C (99.8 °F) (Axillary)   Resp 18   Ht 5' 8" (1.727 m)   Wt 69.9 kg (154 lb)   LMP 10/17/2017 (Exact Date)   SpO2 100%   Breastfeeding? Unknown   BMI 23.42 kg/m²       Pain/Mimi Score: Pain Rating Prior to Med Admin: 8 (6/7/2018  8:56 PM)  Mimi Score: 10 (6/7/2018  9:05 PM)      "

## 2018-06-08 NOTE — L&D DELIVERY NOTE
"[facility]  Vaginal Delivery   Operative Note    SUMMARY      spontaneous  vaginal delivery of live female, Melanie Spencer" infant, was placed on mothers abdomen for skin to skin and bulb suctioning performed and delayed cord clamping x 1 minute.  Infant delivered position OA over intact perineum.  Nuchal cord: No.    Spontaneous delivery of placenta and IV and IM pitocin given noting uterine atony with bleeding. Cytotec 800mcg TN given with good uterine tone and minimal bleeding noted.   No lacerations noted.  Patient tolerated delivery well. Sponge needle and lap counted correctly x2.    Indications: , delivered  Pregnancy complicated by:   Patient Active Problem List   Diagnosis    Bipolar 1 disorder    Congenital absence of corpus callosum    Encounter for supervision of normal pregnancy in first trimester    Tobacco abuse    , delivered     labor with delivery     infant, 2,000-2,499 grams     premature rupture of membranes (PPROM) with unknown onset of labor     Admitting GA: 33w2d    Delivery Information for  Nish Hughes    Birth information:  YOB: 2019   Time of birth: 7:34 PM   Sex: female   Head Delivery Date/Time: 2018  7:34 PM   Delivery type: Vaginal, Spontaneous Delivery   Gestational Age: 33w2d    Delivery Providers    Delivering clinician:  Ben Barriga CNM   Provider Role    Marianna Rodriguez RN Registered Nurse    Joceline Salas RN Registered Nurse    Shanti Bucio NP Nurse Practitioner            [place] Measurements    Weight:  2170 g  Length:  45 cm  Head circumference:  28.8 cm  Chest circumference:  27 cm  Abdominal girth:  25 cm          Assessment    Living status:  Living  Apgars:     1 Minute:   5 Minute:   10 Minute:   15 Minute:   20 Minute:     Skin Color:   0  1       Heart Rate:   2  2       Reflex Irritability:   2  2       Muscle Tone:   2  2       Respiratory Effort:   2  2     "   Total:   8  9               Apgars Assigned By:  ONOFRE MILLER         Assisted Delivery Details:    Forceps attempted?:  No  Vacuum extractor attempted?:  No         Shoulder Dystocia    Shoulder dystocia present?:  No           Presentation and Position    Presentation:  Vertex  Position:  Middle Occiput Anterior           Interventions/Resuscitation    Method:  Bulb Suctioning, Tactile Stimulation, NICU Attended       Cord    Vessels:  3 vessels  Complications:  None  Delayed Cord Clamping?:  Yes  Cord Clamped Date/Time:  2018  7:36 PM  Cord Blood Disposition:  Lab  Gases Sent?:  No  Stem Cell Collection (by MD):  No       Placenta    Date and time:  2018  7:46 PM  Removal:  Spontaneous  Appearance:  Intact  Placenta disposition:  discarded           Labor Events:       labor: Yes     Labor Onset Date/Time: 2018 02:00     Dilation Complete Date/Time: 2018 18:15     Start Pushing Date/Time: 2018 19:29     Rupture Date/Time:              Rupture type:           Fluid Amount:        Fluid Color:        Fluid Odor:        Membrane Status (PeriCalm): SRM (Spontaneous Rupture)      Rupture Date/Time (PeriCalm): 2018 01:00:00      Fluid Amount (PeriCalm): Moderate      Fluid Color (PeriCalm): Clear       steroids: Full Course     Antibiotics given for GBS: No     Induction: none     Indications for induction:        Augmentation:       Indications for augmentation:       Labor complications: Other Excessive Bleeding     Additional complications:          Cervical ripening:                     Delivery:      Episiotomy: None     Indication for Episiotomy:       Perineal Lacerations: None Repaired:      Periurethral Laceration: none Repaired:     Labial Laceration: none Repaired:     Sulcus Laceration: none Repaired:     Vaginal Laceration: No Repaired:     Cervical Laceration: No Repaired:     Repair suture: None     Repair # of packets:       Vaginal delivery QBL  (mL): 400      QBL (mL): 0     Combined Blood Loss (mL): 400     Vaginal Sweep Performed: No     Surgicount Correct: Yes       Other providers:       Anesthesia    Method:  Epidural          Details (if applicable):  Trial of Labor      Categorization:      Priority:     Indications for :     Incision Type:       Additional  information:  Forceps:    Vacuum:    Breech:    Observed anomalies    Other (Comments):

## 2018-06-08 NOTE — ANESTHESIA RELEASE NOTE
"Anesthesia Release from PACU Note    Patient: Maria Isabel Hughes    Procedure(s) Performed: epidural    Anesthesia type: epidural    Post pain: Adequate analgesia    Post assessment: no apparent anesthetic complications    Last Vitals:   Visit Vitals  BP (!) 99/43   Pulse 75   Temp 37.7 °C (99.8 °F) (Axillary)   Resp 18   Ht 5' 8" (1.727 m)   Wt 69.9 kg (154 lb)   LMP 10/17/2017 (Exact Date)   SpO2 100%   Breastfeeding? Unknown   BMI 23.42 kg/m²       Post vital signs: stable    Level of consciousness: awake    Nausea/Vomiting: no nausea/no vomiting    Complications: none    Airway Patency: patent    Respiratory: unassisted, spontaneous ventilation, room air    Cardiovascular: stable and blood pressure at baseline    Hydration: euvolemic  "

## 2018-06-08 NOTE — LACTATION NOTE
Visited mother at bedside. This is a third baby for mother; had a good experience before with breastfeeding. Mother is using hospital pump at the moment; denies any concerns. Will contact lactation as needed.

## 2018-06-08 NOTE — PLAN OF CARE
Problem: Patient Care Overview  Goal: Plan of Care Review   06/07/18 2040   Coping/Psychosocial   Plan Of Care Reviewed With patient       Problem: Infection, Risk/Actual (Adult)  Intervention: Manage Suspected/Actual Infection   06/07/18 0800   Safety Interventions   Infection Management aseptic technique maintained       Goal: Infection Prevention/Resolution  Patient will demonstrate the desired outcomes by discharge/transition of care.    06/07/18 2040   Infection, Risk/Actual (Adult)   Infection Prevention/Resolution making progress toward outcome       Problem: Postpartum (Vaginal Delivery) (Adult,Obstetrics,Pediatric)  Intervention: Support Life/Role Transition   06/07/18 2040   Coping/Psychosocial Interventions   Parent/Child Attachment Promotion attachment promoted   Psychosocial Support   Family/Support System Care caregiver stress acknowledged     Intervention: Minimize/Manage Infection Risk   06/07/18 0600   Hygiene Care   Perineal Care absorbent pad changed     Intervention: Prevent/Manage DVT/VTE Risk   06/07/18 1823   Minimize Embolism Risk   VTE Prevention/Management intravenous hydration   OTHER   VTE Required Core Measure Sequential compression device initiated/maintained       Goal: Signs and Symptoms of Listed Potential Problems Will be Absent, Minimized or Managed (Postpartum)  Signs and symptoms of listed potential problems will be absent, minimized or managed by discharge/transition of care (reference Postpartum (Vaginal Delivery) (Adult,Obstetrics,Pediatric) [facility]).   06/07/18 2040   Postpartum (Vaginal Delivery)   Problems Assessed (Postpartum Vaginal Delivery) all   Problems Present (Postpartum Vaginal Delivery) none

## 2018-06-08 NOTE — PROGRESS NOTES
[facility] -   Obstetrics  Postpartum Progress Note    Patient Name: Maria Isabel Hughes  MRN: [id number]  Admission Date: 2018  Hospital Length of Stay: 1 days  Attending Physician: Janes Feliz MD  Primary Care Provider: Janes Feliz MD    Subjective:     Principal Problem:, delivered    Hospital course: Admitted with PPROM, 4 cm with RUC, abx, MgSO4, BMZ, cultures sent  18 Routine pp care     Interval History:     She is doing well this morning. She is tolerating a regular diet without nausea or vomiting. She is voiding spontaneously. She is ambulating. She has passed flatus, and has not a BM. Vaginal bleeding is mild. She denies fever or chills. Abdominal pain is mild and controlled with oral medications. She is pumping dt NICU admit. She desires circumcision for her male baby: not applicable.    Objective:     Vital Signs (Most Recent):  Temp: 98.9 °F (37.2 °C) (18 0705)  Pulse: 66 (18 1015)  Resp: 18 (18 0705)  BP: (!) 104/58 (18 1015)  SpO2: 100 % (18 2136) Vital Signs (24h Range):  Temp:  [98.5 °F (36.9 °C)-99.8 °F (37.7 °C)] 98.9 °F (37.2 °C)  Pulse:  [] 66  Resp:  [16-18] 18  SpO2:  [98 %-100 %] 100 %  BP: ()/(29-86) 104/58     Weight: 69.9 kg (154 lb)  Body mass index is 23.42 kg/m².      Intake/Output Summary (Last 24 hours) at 18 1036  Last data filed at 18 0300   Gross per 24 hour   Intake              525 ml   Output             4340 ml   Net            -3815 ml       Significant Labs:  Lab Results   Component Value Date    GROUPTRH O POS 2018    HEPBSAG Negative 11/15/2017    HEPBSAG Negative 11/15/2017    STREPBCULT Normal cervicovaginal bravo present 2018       Recent Labs  Lab 18  0225   HGB 12.7   HCT 36.4*       I have personallly reviewed all pertinent lab results from the last 24 hours.    Physical Exam:   Constitutional: She is oriented to person, place, and time. She appears well-developed and  well-nourished.    HENT:   Head: Normocephalic.    Eyes: Pupils are equal, round, and reactive to light.    Neck: Normal range of motion.    Cardiovascular: Normal rate, regular rhythm and normal heart sounds.     Pulmonary/Chest: Effort normal.        Abdominal: Soft.     Genitourinary: Vagina normal and uterus normal.           Musculoskeletal: Normal range of motion.       Neurological: She is alert and oriented to person, place, and time.    Skin: Skin is warm and dry.    Psychiatric: She has a normal mood and affect. Her behavior is normal. Judgment and thought content normal.       Assessment/Plan:     23 y.o. female  for:    * , delivered    Routine pp care         Congenital absence of corpus callosum    Noted on US and MRI, followed by M            Disposition: As patient meets milestones, will plan to discharge 18.    Hanna Nelson CNM  Obstetrics  [facility]

## 2018-06-09 VITALS
RESPIRATION RATE: 18 BRPM | SYSTOLIC BLOOD PRESSURE: 113 MMHG | OXYGEN SATURATION: 100 % | DIASTOLIC BLOOD PRESSURE: 68 MMHG | HEIGHT: 68 IN | TEMPERATURE: 98 F | HEART RATE: 60 BPM | BODY MASS INDEX: 23.34 KG/M2 | WEIGHT: 154 LBS

## 2018-06-09 PROBLEM — O42.919 PRETERM PREMATURE RUPTURE OF MEMBRANES (PPROM) WITH UNKNOWN ONSET OF LABOR: Status: RESOLVED | Noted: 2018-06-07 | Resolved: 2018-06-09

## 2018-06-09 PROBLEM — Z34.91 ENCOUNTER FOR SUPERVISION OF NORMAL PREGNANCY IN FIRST TRIMESTER: Status: RESOLVED | Noted: 2017-12-13 | Resolved: 2018-06-09

## 2018-06-09 LAB — BACTERIA SPEC AEROBE CULT: NORMAL

## 2018-06-09 PROCEDURE — 25000003 PHARM REV CODE 250: Performed by: ADVANCED PRACTICE MIDWIFE

## 2018-06-09 RX ORDER — SERTRALINE HYDROCHLORIDE 25 MG/1
25 TABLET, FILM COATED ORAL DAILY
Qty: 30 TABLET | Refills: 2 | Status: SHIPPED | OUTPATIENT
Start: 2018-06-10 | End: 2018-07-19

## 2018-06-09 RX ORDER — HYDROCODONE BITARTRATE AND ACETAMINOPHEN 5; 325 MG/1; MG/1
1 TABLET ORAL EVERY 6 HOURS PRN
Qty: 5 TABLET | Refills: 0 | Status: SHIPPED | OUTPATIENT
Start: 2018-06-09 | End: 2018-07-19

## 2018-06-09 RX ADMIN — HYDROCODONE BITARTRATE AND ACETAMINOPHEN 1 TABLET: 5; 325 TABLET ORAL at 08:06

## 2018-06-09 RX ADMIN — IBUPROFEN 600 MG: 600 TABLET ORAL at 11:06

## 2018-06-09 RX ADMIN — HYDROCODONE BITARTRATE AND ACETAMINOPHEN 1 TABLET: 5; 325 TABLET ORAL at 01:06

## 2018-06-09 RX ADMIN — SERTRALINE HYDROCHLORIDE 25 MG: 25 TABLET ORAL at 08:06

## 2018-06-09 RX ADMIN — IBUPROFEN 600 MG: 600 TABLET ORAL at 12:06

## 2018-06-09 NOTE — DISCHARGE SUMMARY
[facility] -   Obstetrics  Discharge Summary      Patient Name: Maria Isabel Hughes  MRN: [id number]  Admission Date: 2018  Hospital Length of Stay: 2 days  Discharge Date and Time: 18  Attending Physician: Janes Feliz MD   Discharging Provider: Trino Mares CNM  Primary Care Provider: Janes Feliz MD    HPI: 24yo  ESTRELLA 18 EGA 33w2d arrived c/o a gush of fluid at 0100 AM, clear fluid noted, previous vaginal delivery at 36w5d then a c/s at 37wks secondary to breech presentation, desires TOLAC if delivers. 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] 02/10/2016 Not Applicable      Problems Resolved During this Admission:    Diagnosis Date Noted Date Resolved POA     premature rupture of membranes (PPROM) with unknown onset of labor [O42.919] 2018 Yes     premature rupture of membranes (PPROM) with unknown onset of labor [O42.919] 2018 Yes    History of  delivery, currently pregnant [O34.219] 01/15/2018 2018 Yes        Labs: All labs within the past 24 hours have been reviewed    Feeding Method: pumping, baby in NICU    Immunizations     Date Immunization Status Dose Route/Site Given by    18 MMR Incomplete 0.5 mL Subcutaneous/Left deltoid           Delivery:    Episiotomy: None   Lacerations: None   Repair suture: None   Repair # of packets:     Blood loss (ml): 400     Birth information:  Date  of birth: 6/7/2019   Time of birth: 7:34 PM   Sex: female   Delivery type: Vaginal, Spontaneous Delivery   Gestational Age: 33w2d    Delivery Clinician:      Other providers:       Additional  information:  Forceps:    Vacuum:    Breech:    Observed anomalies      Living?:           APGARS  One minute Five minutes Ten minutes   Skin color:         Heart rate:         Grimace:         Muscle tone:         Breathing:         Totals: 8  9        Placenta: Delivered:       appearance    Pending Diagnostic Studies:     None          Discharged Condition: good    Disposition: Home or Self Care    Follow Up:  Follow-up Information     Melita Rojas CNM In 4 weeks.    Specialty:  Obstetrics  Why:  PP  Contact information:  [address]  [place] [id number] [phone]                 Patient Instructions:   No discharge procedures on file.  Medications:  Current Discharge Medication List      START taking these medications    Details   HYDROcodone-acetaminophen (NORCO) 5-325 mg per tablet Take 1 tablet by mouth every 6 (six) hours as needed.  Qty: 5 tablet, Refills: 0      sertraline (ZOLOFT) 25 MG tablet Take 1 tablet (25 mg total) by mouth once daily.  Qty: 30 tablet, Refills: 2             Trino Mares CNM  Obstetrics  [facility] -

## 2018-06-09 NOTE — DISCHARGE INSTRUCTIONS

## 2018-06-09 NOTE — PLAN OF CARE
Problem: Patient Care Overview  Goal: Plan of Care Review  Outcome: Ongoing (interventions implemented as appropriate)  Pt is progressing well. Pain is controlled with PO pain meds. Ambulates independently and voids without difficulty. Pumping and bringing to NICU. VSS. Will continue to monitor.

## 2018-06-09 NOTE — NURSING
Discharge Instructions given along with AVS packet. Lactation also saw patient before patient was discharged with a breastfeeding plan for infant.Patient went home with a handheld pump. Patient infant currently in the NICU. Patient's vitals are stable and is no distress. Mother discharged via wheelchair.

## 2018-06-09 NOTE — PROGRESS NOTES
S: Pt reports she is very emotional. Had PP depression with first child, not with second, and believes she is starting with this one and the NICU admission is not helping. She has been crying most of the day. Doesn't know if she wants medication becomes she wants to continue pumping for the baby.     O:   VSS/AF  Pumping at bedside, visibly emotional    A:   Possible PP Depression   S/p  33 week spontaneous vaginal delivery of viable female infant in NICU for care     P:   Discussed medication options with pt and safety during breastfeeding, non-pharmacological methods also discussed  S&S of PP depression and PP psychosis and when to seek help reviewed   Will begin Zoloft 25mg po daily at this time

## 2018-07-02 ENCOUNTER — TELEPHONE (OUTPATIENT)
Dept: OBSTETRICS AND GYNECOLOGY | Facility: CLINIC | Age: 23
End: 2018-07-02

## 2018-07-02 NOTE — TELEPHONE ENCOUNTER
----- Message from Leigh Pyle sent at 7/2/2018 12:11 PM CDT -----  Contact: Pt   Pt requested a callback in regards to appointment for today she canceled wanted to know if she could be worked in this week on Thursday 7-5-2018..[phone] (work)

## 2018-07-02 NOTE — TELEPHONE ENCOUNTER
Attempted to contact pt. No answer. Left message for pt to call back. No available appointments left this week at [facility] location.

## 2018-07-19 ENCOUNTER — POSTPARTUM VISIT (OUTPATIENT)
Dept: OBSTETRICS AND GYNECOLOGY | Facility: CLINIC | Age: 23
End: 2018-07-19
Payer: MEDICAID

## 2018-07-19 VITALS — BODY MASS INDEX: 22.12 KG/M2 | WEIGHT: 145.5 LBS | DIASTOLIC BLOOD PRESSURE: 68 MMHG | SYSTOLIC BLOOD PRESSURE: 100 MMHG

## 2018-07-19 PROBLEM — O34.219 VBAC, DELIVERED: Status: RESOLVED | Noted: 2018-06-07 | Resolved: 2018-07-19

## 2018-07-19 PROCEDURE — 0503F POSTPARTUM CARE VISIT: CPT | Mod: ,,, | Performed by: ADVANCED PRACTICE MIDWIFE

## 2018-07-19 PROCEDURE — 99999 PR PBB SHADOW E&M-EST. PATIENT-LVL II: CPT | Mod: PBBFAC,,, | Performed by: ADVANCED PRACTICE MIDWIFE

## 2018-07-19 PROCEDURE — 99212 OFFICE O/P EST SF 10 MIN: CPT | Mod: PBBFAC,PO | Performed by: ADVANCED PRACTICE MIDWIFE

## 2018-07-19 PROCEDURE — 96372 THER/PROPH/DIAG INJ SC/IM: CPT | Mod: PBBFAC,PO

## 2018-07-19 RX ORDER — MEDROXYPROGESTERONE ACETATE 150 MG/ML
150 INJECTION, SUSPENSION INTRAMUSCULAR
Status: ACTIVE | OUTPATIENT
Start: 2018-07-19 | End: 2019-10-12

## 2018-07-19 RX ORDER — QUETIAPINE FUMARATE 100 MG/1
100 TABLET, FILM COATED ORAL DAILY
COMMUNITY

## 2018-07-19 RX ADMIN — MEDROXYPROGESTERONE ACETATE 150 MG: 150 INJECTION, SUSPENSION INTRAMUSCULAR at 02:07

## 2018-07-19 NOTE — PROGRESS NOTES
Subjective:       Maria Isabel Hughes is a 23 y.o. female who presents for a postpartum visit. She is 4 weeks postpartum following a spontaneous vaginal delivery. I have fully reviewed the prenatal and intrapartum course. The delivery was at 33 gestational weeks.SROM Outcome: spontaneous vaginal delivery. Anesthesia: epidural. Postpartum course has been normal. Baby's course has been normal. Baby is feeding by bottle - Similac with Iron. Bleeding moderate lochia. Bowel function is normal. Bladder function is normal. Patient is not sexually active. Contraception method is Depo-Provera injections. Postpartum depression screening: negative.    The following portions of the patient's history were reviewed and updated as appropriate: allergies, current medications, past family history, past medical history, past social history, past surgical history and problem list.    Review of Systems  A comprehensive review of systems was negative.     Objective:      /68   Wt 66 kg (145 lb 8.1 oz)   LMP 10/17/2017 (Exact Date)   Breastfeeding? No   BMI 22.12 kg/m²    General:  alert, appears stated age and cooperative    Breasts:  inspection negative, no nipple discharge or bleeding, no masses or nodularity palpable   Lungs: clear to auscultation bilaterally   Heart:  regular rate and rhythm, S1, S2 normal, no murmur, click, rub or gallop   Abdomen: soft, non-tender; bowel sounds normal; no masses,  no organomegaly    Vulva:  normal   Vagina: normal vagina   Cervix:  anteverted, no cervical motion tenderness and no lesions   Corpus: normal size, contour, position, consistency, mobility, non-tender   Adnexa:  normal adnexa   Rectal Exam: Not performed.          Assessment:       normal postpartum exam. Pap smear not done at today's visit.     Plan:      1. Contraception: Depo-Provera injections  2.   3. Follow up in: 3 months or as needed.

## 2018-07-19 NOTE — PROGRESS NOTES
Patient identified using two patient identifiers. Allergies and prescriptions reviewed. Pt received a Depo Provera 150 mg/mL IM injection to the left ventrogluteal. Pt tolerated injection with no complaints. Pt was instructed to wait 15 minutes to monitor for S/S of side effects. Pt verbalized understanding. No S/S noted at this time. Pt scheduled her next depo for 10/9/18 @ 8:30 am.

## 2018-11-01 ENCOUNTER — TELEPHONE (OUTPATIENT)
Dept: OBSTETRICS AND GYNECOLOGY | Facility: CLINIC | Age: 23
End: 2018-11-01

## 2018-11-01 DIAGNOSIS — N91.2 AMENORRHEA: Primary | ICD-10-CM

## 2018-11-01 NOTE — TELEPHONE ENCOUNTER
----- Message from Soumya Benitez sent at 11/1/2018  7:42 AM CDT -----  needs to r/s depo...[phone] ([place])

## 2018-11-01 NOTE — TELEPHONE ENCOUNTER
Patient wanted to reschedule her depo, but she is now out of her date range.  Informed patient that I would send STEPHEN Hua a message to see if she wants to do a blood pregnancy test, or a urine pregnancy test, before we can schedule a depo nurse visit.  Patient verbalized understanding.

## 2018-11-01 NOTE — TELEPHONE ENCOUNTER
Called patient to let her know that STEPHEN Hua put in a beta hcg order draw for today.  Patient is coming today to have her blood drawn.  Informed patient once we get results, we will call her back to schedule her depo nurse visit.  Patient verbalized understanding.

## 2020-04-20 ENCOUNTER — TELEPHONE (OUTPATIENT)
Dept: OBSTETRICS AND GYNECOLOGY | Facility: CLINIC | Age: 25
End: 2020-04-20

## 2020-04-20 NOTE — TELEPHONE ENCOUNTER
Pt currently on cycle and desires to start depo provera. Pt has been scheduled a annual mychart visit for an evaluation and to discuss contraception. HARLEY

## 2020-04-20 NOTE — TELEPHONE ENCOUNTER
----- Message from Chloe Gimenez sent at 4/18/2020  7:39 AM CDT -----  Contact: Pt   Pt is requesting a new patient appt with any physician   Pt would like to est care and get birth control     Pt can be reached at [phone]

## 2023-01-10 ENCOUNTER — TELEPHONE (OUTPATIENT)
Dept: OBSTETRICS AND GYNECOLOGY | Facility: CLINIC | Age: 28
End: 2023-01-10
Payer: MEDICAID

## 2023-01-10 NOTE — TELEPHONE ENCOUNTER
Called patient and advised new gyn Medicaid appointments are very limited and are booked until the end of 2023 or later.  Patient advised to try [facility] at [phone], [facility] at [phone] or [facility] at [phone].  Patient verbalized understanding.

## 2023-01-10 NOTE — TELEPHONE ENCOUNTER
----- Message from Jolanta Wright sent at 1/10/2023  3:21 PM CST -----  Regarding: Appt  Contact: [phone]  Patient Maria Isabel is calling. Patient is calling in ref to her Depo. Patient was last seen in 2018. Patient being seen by another doctor but no longer take her ins. Informed patient next appt isnt until Feb but patient asked to send a message to see if office will give her an rx for her Depo. Please call patient at [phone]    Thank You

## 2024-08-28 ENCOUNTER — HOSPITAL ENCOUNTER (EMERGENCY)
Facility: HOSPITAL | Age: 29
Discharge: HOME | End: 2024-08-28
Attending: EMERGENCY MEDICINE
Payer: MEDICAID

## 2024-08-28 VITALS
RESPIRATION RATE: 20 BRPM | BODY MASS INDEX: 26.52 KG/M2 | DIASTOLIC BLOOD PRESSURE: 63 MMHG | OXYGEN SATURATION: 100 % | HEART RATE: 87 BPM | WEIGHT: 175 LBS | SYSTOLIC BLOOD PRESSURE: 111 MMHG | HEIGHT: 68 IN | TEMPERATURE: 98 F

## 2024-08-28 DIAGNOSIS — R07.89 CHEST WALL PAIN: Primary | ICD-10-CM

## 2024-08-28 DIAGNOSIS — R07.9 CHEST PAIN: ICD-10-CM

## 2024-08-28 LAB
B-HCG UR QL: NEGATIVE
CTP QC/QA: YES
OHS QRS DURATION: 74 MS
OHS QTC CALCULATION: 452 MS

## 2024-08-28 PROCEDURE — 99284 EMERGENCY DEPT VISIT MOD MDM: CPT | Mod: 25

## 2024-08-28 PROCEDURE — 93005 ELECTROCARDIOGRAM TRACING: CPT

## 2024-08-28 PROCEDURE — 93010 ELECTROCARDIOGRAM REPORT: CPT | Mod: ,,, | Performed by: INTERNAL MEDICINE

## 2024-08-28 PROCEDURE — 81025 URINE PREGNANCY TEST: CPT | Performed by: PHYSICIAN ASSISTANT

## 2024-08-28 RX ORDER — NAPROXEN 500 MG/1
500 TABLET ORAL 2 TIMES DAILY WITH MEALS
Qty: 30 TABLET | Refills: 0 | Status: SHIPPED | OUTPATIENT
Start: 2024-08-28

## 2024-08-28 NOTE — Clinical Note
"Maria Isabel Rubalcava" Ellen was seen and treated in our emergency department on 8/28/2024.  She may return to work on 08/29/2024.       If you have any questions or concerns, please don't hesitate to call.      Jb Souza PA-C"

## 2024-08-28 NOTE — ED TRIAGE NOTES
Patient arrives to the ED w/ complaints of CP x 2 days. Describes pains as stabbing and 7 out of 10. Reports she has been experiencing CP intermittently x 1-2 months. States CP worsens when reaching up w/ arms and taking deep breaths. Reports vaginal bleeding x 2 weeks. States it began at the time of her usual period, but has not stopped. Reports cramping. Reports fully saturating 5-6 tampons per day. Reports the length and heaviness if abnormal for her baseline. Denies SOB, headache, N/V, increased flatulence or belching.  Denies taking medication PTA.

## 2024-08-28 NOTE — ED PROVIDER NOTES
Encounter Date: 2024       History     Chief Complaint   Patient presents with    Chest Pain     C/o CP since yesterday. Constant sharp substernal /10. Hx of anxiety. Spoke with PCP and was told she was having a panic attack. Denies SOB or other sx.      29-year-old female, denying significant past medical history, presents to ED with concern of sharp midsternal chest pain that she states has been intermittent for over a year but more constant over past 24 hours.  Pain is sharp, worse with touch and movement, improved with rest, severity 7/10.  Denies any associated palpitations, cough, shortness of breath.  No injury or trauma.  She does work in [employer].  No recent travel or surgery, use of oral contraceptive, unilateral leg swelling, history of DVT/PE.  No other acute complaints at this time    The history is provided by the patient.     Review of patient's allergies indicates:  No Known Allergies  Past Medical History:   Diagnosis Date    Mental disorder     Postpartum depression      Past Surgical History:   Procedure Laterality Date     SECTION      x 1    KNEE SURGERY      right knee surgery       Family History   Problem Relation Name Age of Onset    Diabetes Maternal Grandmother      Cancer Maternal Grandmother          pancreatic    Hypertension Maternal Grandmother      Diabetes Maternal Grandfather      Hypertension Maternal Grandfather      Hypertension Mother      Cancer Mother          bladder cancer    Breast cancer Neg Hx      Colon cancer Neg Hx      Eclampsia Neg Hx      Miscarriages / Stillbirths Neg Hx      Ovarian cancer Neg Hx       labor Neg Hx      Stroke Neg Hx       Social History     Tobacco Use    Smoking status: Every Day     Current packs/day: 0.25     Average packs/day: 0.3 packs/day for 6.0 years (1.5 ttl pk-yrs)     Types: Cigarettes    Smokeless tobacco: Never   Substance Use Topics    Alcohol use: No    Drug use: No     Review of Systems   Constitutional:   Negative for chills and fever.   Respiratory:  Negative for cough and shortness of breath.    Cardiovascular:  Positive for chest pain. Negative for palpitations and leg swelling.   Gastrointestinal:  Negative for abdominal pain, diarrhea, nausea and vomiting.   Musculoskeletal:  Negative for back pain.       Physical Exam     Initial Vitals [08/28/24 0735]   BP Pulse Resp Temp SpO2   117/64 84 18 97.9 °F (36.6 °C) 99 %      MAP       --         Physical Exam    Vitals reviewed.  Constitutional: She appears well-developed and well-nourished. She is active. She does not have a sickly appearance. She does not appear ill. No distress.   HENT:   Head: Normocephalic and atraumatic.   Neck:   Normal range of motion.  Cardiovascular:  Normal rate and regular rhythm.           Pulmonary/Chest: Effort normal and breath sounds normal.   Reproducible midsternal chest wall tenderness.  Lungs are clear   Musculoskeletal:      Cervical back: Normal range of motion.     Neurological: She is alert. GCS eye subscore is 4. GCS verbal subscore is 5. GCS motor subscore is 6.   Skin: Skin is warm and dry.   Psychiatric: She has a normal mood and affect. Her speech is normal and behavior is normal.         ED Course   Procedures  Labs Reviewed   POCT URINE PREGNANCY       Result Value    POC Preg Test, Ur Negative       Acceptable Yes          ECG Results              EKG 12-lead (Chest Pain) Age >30 (Final result)        Collection Time Result Time QRS Duration OHS QTC Calculation    08/28/24 07:35:36 08/28/24 08:12:06 74 452                     Final result by Interface, Lab [facility] (08/28/24 08:12:12)                   Narrative:    Test Reason : R07.9,    Vent. Rate : 093 BPM     Atrial Rate : 093 BPM     P-R Int : 180 ms          QRS Dur : 074 ms      QT Int : 364 ms       P-R-T Axes : 076 095 074 degrees     QTc Int : 452 ms    Normal sinus rhythm  Possible Left atrial enlargement  Rightward axis  Borderline  "Abnormal ECG  No previous ECGs available  Confirmed by Carlyn Pereyra MD ([id number]) on 8/28/2024 8:12:05 AM    Referred By:  MD           Confirmed By:Carlyn Pereyra MD                                  Imaging Results              X-Ray Chest 1 View (Final result)  Result time 08/28/24 08:51:52      Final result by David Meade MD (08/28/24 08:51:52)                   Impression:      No acute cardiopulmonary finding identified on this single view.      Electronically signed by: David Meade MD  Date:    08/28/2024  Time:    08:51               Narrative:    EXAMINATION:  XR CHEST 1 VIEW    CLINICAL HISTORY:  Provided history is "  Chest pain, unspecified".    TECHNIQUE:  One view of the chest.    COMPARISON:  None.    FINDINGS:  Patient is rotated.  Cardiac wires overlie the chest.  Cardiomediastinal silhouette is not enlarged.  No focal consolidation.  No sizable pleural effusion.  No pneumothorax.                                       Medications - No data to display  Medical Decision Making  Patient presents with concern of midsternal chest pain has been intermittent over a year but more constant over past 24 hours.  Symptoms worse with touch or movement.  Afebrile with vitals WNL.  Patient overall very well-appearing on exam.  Reproducible midsternal chest wall tenderness noted    DDx:  Including but not limited to musculoskeletal, costochondritis, strain, sprain, pleurisy, pneumonia, pneumothorax, pulmonary effusion, less likely ACS/PE    Amount and/or Complexity of Data Reviewed  Labs: ordered. Decision-making details documented in ED Course.  Radiology: ordered. Decision-making details documented in ED Course.  ECG/medicine tests: ordered.     Details: EKG NSR, rate 93, no ST elevation.  No STEMI.  No acute ischemic changes noted.  EKG reviewed by attending, Dr. Rosen    Risk  Prescription drug management.               ED Course as of 08/28/24 0947   Wed Aug 28, 2024   0825 BP: 117/64 " [KS]   0825 Temp: 97.9 °F (36.6 °C) [KS]   0825 Pulse: 84 [KS]   0825 Resp: 18 [KS]   0825 SpO2: 99 % [KS]   0825 Vitals WNL. [KS]   0825 Perc score 0; I do not suspect PE.  Patient is very low risk for ACS.  EKG unremarkable.  CXR pending [KS]   0910 POCT urine pregnancy  Negative [KS]   0936 X-Ray Chest 1 View  No acute cardiopulmonary findings [KS]   0945 Patient continues to rest comfortably and in no apparent distress.  With careful consideration, I do have high suspicion patient's presenting chest pain is musculoskeletal and will continue with supportive care.  Prescription written for naproxen.  Encouraged close PCP follow-up with ED return precautions.  Patient states her understanding and agrees with plan. [KS]      ED Course User Index  [KS] Jb Souza PA-C                             Clinical Impression:  Final diagnoses:  [R07.9] Chest pain  [R07.89] Chest wall pain (Primary)          ED Disposition Condition    Discharge Stable          ED Prescriptions       Medication Sig Dispense Start Date End Date Auth. Provider    naproxen (NAPROSYN) 500 MG tablet Take 1 tablet (500 mg total) by mouth 2 (two) times daily with meals. 30 tablet 8/28/2024 -- Jb Souza PA-C          Follow-up Information       Follow up With Specialties Details Why Contact Info    Janes Feliz MD Obstetrics and Gynecology   [address]  [place] [address]  [phone]               Jb Souza PA-C  08/28/24 2860

## 2024-08-28 NOTE — DISCHARGE INSTRUCTIONS
